# Patient Record
Sex: FEMALE | Employment: UNEMPLOYED | URBAN - METROPOLITAN AREA
[De-identification: names, ages, dates, MRNs, and addresses within clinical notes are randomized per-mention and may not be internally consistent; named-entity substitution may affect disease eponyms.]

---

## 2017-10-10 ENCOUNTER — HOSPITAL ENCOUNTER (EMERGENCY)
Age: 82
Discharge: HOME | End: 2017-10-10
Attending: EMERGENCY MEDICINE
Payer: MEDICARE

## 2017-10-10 VITALS
OXYGEN SATURATION: 100 % | DIASTOLIC BLOOD PRESSURE: 63 MMHG | SYSTOLIC BLOOD PRESSURE: 130 MMHG | TEMPERATURE: 97.7 F | HEART RATE: 60 BPM | RESPIRATION RATE: 15 BRPM

## 2017-10-10 DIAGNOSIS — R55 SYNCOPE AND COLLAPSE: Primary | ICD-10-CM

## 2017-10-10 LAB
ALBUMIN SERPL-MCNC: 3.3 G/DL (ref 3.4–5)
ALBUMIN/GLOB SERPL: 0.9 {RATIO} (ref 0.8–1.7)
ALP SERPL-CCNC: 73 U/L (ref 45–117)
ALT SERPL-CCNC: 17 U/L (ref 13–56)
ANION GAP SERPL CALC-SCNC: 5 MMOL/L (ref 3–18)
AST SERPL-CCNC: 16 U/L (ref 15–37)
BASOPHILS # BLD: 0 K/UL (ref 0–0.1)
BASOPHILS NFR BLD: 0 % (ref 0–2)
BILIRUB SERPL-MCNC: 0.7 MG/DL (ref 0.2–1)
BUN SERPL-MCNC: 11 MG/DL (ref 7–18)
BUN/CREAT SERPL: 13 (ref 12–20)
CALCIUM SERPL-MCNC: 8.9 MG/DL (ref 8.5–10.1)
CHLORIDE SERPL-SCNC: 107 MMOL/L (ref 100–108)
CK MB CFR SERPL CALC: NORMAL % (ref 0–4)
CK MB SERPL-MCNC: <1 NG/ML (ref 5–25)
CK SERPL-CCNC: 80 U/L (ref 26–192)
CO2 SERPL-SCNC: 28 MMOL/L (ref 21–32)
CREAT SERPL-MCNC: 0.83 MG/DL (ref 0.6–1.3)
DIFFERENTIAL METHOD BLD: ABNORMAL
EOSINOPHIL # BLD: 0.2 K/UL (ref 0–0.4)
EOSINOPHIL NFR BLD: 5 % (ref 0–5)
ERYTHROCYTE [DISTWIDTH] IN BLOOD BY AUTOMATED COUNT: 13.6 % (ref 11.6–14.5)
GLOBULIN SER CALC-MCNC: 3.6 G/DL (ref 2–4)
GLUCOSE SERPL-MCNC: 98 MG/DL (ref 74–99)
HCT VFR BLD AUTO: 38 % (ref 35–45)
HGB BLD-MCNC: 12.5 G/DL (ref 12–16)
LYMPHOCYTES # BLD: 1 K/UL (ref 0.9–3.6)
LYMPHOCYTES NFR BLD: 27 % (ref 21–52)
MCH RBC QN AUTO: 30.1 PG (ref 24–34)
MCHC RBC AUTO-ENTMCNC: 32.9 G/DL (ref 31–37)
MCV RBC AUTO: 91.6 FL (ref 74–97)
MONOCYTES # BLD: 0.4 K/UL (ref 0.05–1.2)
MONOCYTES NFR BLD: 10 % (ref 3–10)
NEUTS SEG # BLD: 2.1 K/UL (ref 1.8–8)
NEUTS SEG NFR BLD: 58 % (ref 40–73)
PLATELET # BLD AUTO: 185 K/UL (ref 135–420)
PMV BLD AUTO: 10.3 FL (ref 9.2–11.8)
POTASSIUM SERPL-SCNC: 4.3 MMOL/L (ref 3.5–5.5)
PROT SERPL-MCNC: 6.9 G/DL (ref 6.4–8.2)
RBC # BLD AUTO: 4.15 M/UL (ref 4.2–5.3)
SODIUM SERPL-SCNC: 140 MMOL/L (ref 136–145)
TROPONIN I SERPL-MCNC: <0.02 NG/ML (ref 0–0.04)
WBC # BLD AUTO: 3.7 K/UL (ref 4.6–13.2)

## 2017-10-10 PROCEDURE — 85025 COMPLETE CBC W/AUTO DIFF WBC: CPT | Performed by: EMERGENCY MEDICINE

## 2017-10-10 PROCEDURE — 93005 ELECTROCARDIOGRAM TRACING: CPT

## 2017-10-10 PROCEDURE — 82550 ASSAY OF CK (CPK): CPT | Performed by: EMERGENCY MEDICINE

## 2017-10-10 PROCEDURE — 80053 COMPREHEN METABOLIC PANEL: CPT | Performed by: EMERGENCY MEDICINE

## 2017-10-10 PROCEDURE — 99283 EMERGENCY DEPT VISIT LOW MDM: CPT

## 2017-10-10 NOTE — ED TRIAGE NOTES
Pt brought in via EMS from [facility]. Pt states she fainted and is feeling a little lightheaded, EMS says they got a call saying pt was unresponsive for a few seconds. .. Pt has hx of HTN BG was 86 as per medic. No LOC and denies hitting her head.

## 2017-10-10 NOTE — Clinical Note
-You were seen here today for: Syncope 
-Please follow up with your regular doctor as soon as possible 
-Return to the emergency department if your symptoms worsen or for any other concerns.

## 2017-10-10 NOTE — DISCHARGE INSTRUCTIONS

## 2017-10-10 NOTE — ED PROVIDER NOTES
HPI Comments: 9:02 AM      Krish Emerson is a 80 y.o. Female with PMHx of HTN, diverticulitis and bilateral arthritis presents to the ED via EMS from [facility] with a chief complaint of syncope 1 hour ago. EMS states receiving a call saying the pt was unresponsive for a few seconds. Nursing home nurse is present bedside and states the patient was having breakfast and became unconscious for about 60 seconds. Nurse reports a pulse of 56 and a normal BP. Pt states she hasn't changed her eating habits recently and is currently not on any new medications. Pt reports light-headedness associated due to the syncopal episode. Nurse reports pt is currently taking Lasix, Iron and Aricept. Pt denies any fever, cough, chest pain, SOB, dysuria, diaphoresis or any other symptoms or complaints. The history is provided by the patient, the EMS personnel and the nursing home. No  was used. Past Medical History:   Diagnosis Date    Arthritis     bilaterlal lower extremeties    Blood in stool 3/2013    GI bleeding        Past Surgical History:   Procedure Laterality Date    HX TONSILLECTOMY           Family History:   Problem Relation Age of Onset    Elevated Lipids Mother     Alcohol abuse Father     Elevated Lipids Father        Social History     Social History    Marital status:      Spouse name: N/A    Number of children: N/A    Years of education: N/A     Occupational History    Not on file. Social History Main Topics    Smoking status: Never Smoker    Smokeless tobacco: Never Used    Alcohol use No    Drug use: No    Sexual activity: Not on file     Other Topics Concern    Not on file     Social History Narrative         ALLERGIES: Review of patient's allergies indicates no known allergies. Review of Systems   Constitutional: Negative for activity change, fatigue and fever. HENT: Negative for congestion and rhinorrhea. Eyes: Negative for visual disturbance. Respiratory: Negative for shortness of breath. Cardiovascular: Negative for chest pain and palpitations. Gastrointestinal: Negative for abdominal pain, diarrhea, nausea and vomiting. Endocrine: Negative for polyuria. Genitourinary: Negative for dysuria and hematuria. Musculoskeletal: Negative for back pain. Skin: Negative for rash. Neurological: Positive for syncope and light-headedness. Negative for dizziness and weakness. Psychiatric/Behavioral: Negative for sleep disturbance. There were no vitals filed for this visit. Physical Exam   Constitutional: She is oriented to person, place, and time. She appears well-developed. HENT:   Head: Normocephalic and atraumatic. Eyes: Conjunctivae and EOM are normal.   Neck: Normal range of motion. Cardiovascular: Regular rhythm and normal heart sounds. Bradycardia present. Exam reveals no gallop and no friction rub. No murmur heard. Pulmonary/Chest: Effort normal and breath sounds normal. No stridor. She exhibits no tenderness. Abdominal: Soft. There is no tenderness. Musculoskeletal: Normal range of motion. She exhibits no tenderness. Neurological: She is alert and oriented to person, place, and time. Skin: Skin is warm and dry. She is not diaphoretic. Psychiatric: She has a normal mood and affect. Her behavior is normal.   Nursing note and vitals reviewed. MDM  Number of Diagnoses or Management Options  Diagnosis management comments: Pt presents with a prior diverticular bleed and recurring IR embolization. Will check blood counts for anemia and cardiac enzymes for any kind of cardiogenic origin of syncope. Will check EKG for any heart arrhythmia. Pt already on iron, so Guaiac test will not be helpful. She is on Lasix and does not appear to be dehydrated. Therefore, fluids will be less helpful in this situation, especially since she has normal/high BP.  Pt has no abdominal pain currently so less likely diverticulosis or diverticulitis, however, if any abnormal lab values, will scan the abdomen. Amount and/or Complexity of Data Reviewed  Clinical lab tests: reviewed and ordered  Tests in the radiology section of CPT®: ordered and reviewed  Tests in the medicine section of CPT®: ordered and reviewed  Decide to obtain previous medical records or to obtain history from someone other than the patient: yes  Obtain history from someone other than the patient: yes  Review and summarize past medical records: yes      ED Course       Procedures      Vitals:  Patient Vitals for the past 12 hrs:   Temp Pulse Resp BP SpO2   10/10/17 1030 - 60 15 - 100 %   10/10/17 1015 - (!) 59 10 130/63 98 %   10/10/17 0900 97.7 °F (36.5 °C) (!) 59 15 148/75 100 %       Medications Ordered:  Medications - No data to display    Lab Findings:  Recent Results (from the past 12 hour(s))   EKG, 12 LEAD, INITIAL    Collection Time: 10/10/17  9:25 AM   Result Value Ref Range    Ventricular Rate 55 BPM    Atrial Rate 55 BPM    P-R Interval 138 ms    QRS Duration 104 ms    Q-T Interval 422 ms    QTC Calculation (Bezet) 403 ms    Calculated P Axis 31 degrees    Calculated R Axis 3 degrees    Calculated T Axis 88 degrees    Diagnosis       Sinus bradycardia  Incomplete right bundle branch block  T wave abnormality, consider anterolateral ischemia  Abnormal ECG  When compared with ECG of 25-MAR-2015 08:51,  Vent. rate has decreased BY  30 BPM  Incomplete right bundle branch block is now present  ST no longer depressed in Anterior leads         EKG Interpretation by ED physician:  Rhythm: Sinus Bradycardia   Rate: 55 bpm  Interpretation: Incomplete right bundle, non-specific ST, no STEMI. EKG interpret by Lizbeth Boles MD 9:32 AM          -Re-evaluted the patient - feeling fine she says, no further dizziness, syncope, or diarrhea.  -Results were discussed and reviewed with pt who understood the implications.  Otherwise reassuring results.     -We discussed the diagnosis, treatment, and plan. Next steps in close outpt care include: close PMD follow up for cbc, ekg. She is stably bradycardic here in the high 59's to low 60s. Ab soft, doubt acute GI bleed here. Care discussed with family and nursing supervisor at her facility.     -They verbally convey understanding and agreement of the signs, symptoms, diagnosis, treatment and prognosis and additionally agree to follow up as discussed. All questions were answered, and we reviewed pertinent return precautions as seen in the discharge paperwork. Pt understood follow up instructions, and would return to the ED if any worsening or concerns. Jannet Rutledge MD  10:43 AM      Diagnosis:   1. Syncope and collapse        Disposition: Discharge     Follow-up Information     None           Patient's Medications   Start Taking    No medications on file   Continue Taking    ACETAMINOPHEN (TYLENOL) 325 MG TABLET    Take 650 mg by mouth every four (4) hours as needed for Pain. ASPIRIN 81 MG TABLET    Take 81 mg by mouth daily. FERROUS SULFATE (IRON) 325 MG (65 MG IRON) EC TABLET    Take 1 Tab by mouth three (3) times daily (with meals). These Medications have changed    No medications on file   Stop Taking    No medications on file       Scribe Attestation      Argenis Frederick acting as a scribe for and in the presence of Jannte Rutledge MD      October 10, 2017 at 9:34 AM       Provider Attestation:      I personally performed the services described in the documentation, reviewed the documentation, as recorded by the scribe in my presence, and it accurately and completely records my words and actions.  October 10, 2017 at 9:34 AM - Jannet Rutledge MD

## 2017-10-10 NOTE — ED NOTES
I have reviewed discharge instructions with the patient. The patient verbalized understanding. Pt left ED in NAD. Pt son was here and he signed for pt discharge and he understood discharge instructions.

## 2017-10-11 LAB
ATRIAL RATE: 55 BPM
CALCULATED P AXIS, ECG09: 31 DEGREES
CALCULATED R AXIS, ECG10: 3 DEGREES
CALCULATED T AXIS, ECG11: 88 DEGREES
DIAGNOSIS, 93000: NORMAL
P-R INTERVAL, ECG05: 138 MS
Q-T INTERVAL, ECG07: 422 MS
QRS DURATION, ECG06: 104 MS
QTC CALCULATION (BEZET), ECG08: 403 MS
VENTRICULAR RATE, ECG03: 55 BPM

## 2018-01-09 ENCOUNTER — HOSPITAL ENCOUNTER (EMERGENCY)
Age: 83
Discharge: HOME | End: 2018-01-09
Attending: EMERGENCY MEDICINE
Payer: MEDICARE

## 2018-01-09 ENCOUNTER — APPOINTMENT (OUTPATIENT)
Dept: CT IMAGING | Age: 83
End: 2018-01-09
Attending: PHYSICIAN ASSISTANT
Payer: MEDICARE

## 2018-01-09 VITALS
SYSTOLIC BLOOD PRESSURE: 150 MMHG | RESPIRATION RATE: 16 BRPM | DIASTOLIC BLOOD PRESSURE: 73 MMHG | HEART RATE: 67 BPM | WEIGHT: 180 LBS | OXYGEN SATURATION: 96 % | BODY MASS INDEX: 27.37 KG/M2 | TEMPERATURE: 97.9 F
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
## 2018-01-09 DIAGNOSIS — N39.0 URINARY TRACT INFECTION WITHOUT HEMATURIA, SITE UNSPECIFIED: ICD-10-CM

## 2018-01-09 LAB
ANION GAP SERPL CALC-SCNC: 6 MMOL/L (ref 3–18)
APPEARANCE UR: CLEAR
BACTERIA URNS QL MICRO: ABNORMAL /HPF
BASOPHILS # BLD: 0 K/UL (ref 0–0.06)
BASOPHILS NFR BLD: 0 % (ref 0–2)
BILIRUB UR QL: NEGATIVE
BUN SERPL-MCNC: 14 MG/DL (ref 7–18)
BUN/CREAT SERPL: 16 (ref 12–20)
CALCIUM SERPL-MCNC: 9.3 MG/DL (ref 8.5–10.1)
CHLORIDE SERPL-SCNC: 103 MMOL/L (ref 100–108)
CO2 SERPL-SCNC: 31 MMOL/L (ref 21–32)
COLOR UR: YELLOW
CREAT SERPL-MCNC: 0.85 MG/DL (ref 0.6–1.3)
DIFFERENTIAL METHOD BLD: ABNORMAL
EOSINOPHIL # BLD: 0.1 K/UL (ref 0–0.4)
EOSINOPHIL NFR BLD: 3 % (ref 0–5)
EPITH CASTS URNS QL MICRO: ABNORMAL /LPF (ref 0–5)
ERYTHROCYTE [DISTWIDTH] IN BLOOD BY AUTOMATED COUNT: 13.1 % (ref 11.6–14.5)
GLUCOSE SERPL-MCNC: 95 MG/DL (ref 74–99)
GLUCOSE UR STRIP.AUTO-MCNC: NEGATIVE MG/DL
HCT VFR BLD AUTO: 36.3 % (ref 35–45)
HGB BLD-MCNC: 11.6 G/DL (ref 12–16)
HGB UR QL STRIP: NEGATIVE
KETONES UR QL STRIP.AUTO: NEGATIVE MG/DL
LEUKOCYTE ESTERASE UR QL STRIP.AUTO: ABNORMAL
LYMPHOCYTES # BLD: 1.2 K/UL (ref 0.9–3.6)
LYMPHOCYTES NFR BLD: 26 % (ref 21–52)
MCH RBC QN AUTO: 29.3 PG (ref 24–34)
MCHC RBC AUTO-ENTMCNC: 32 G/DL (ref 31–37)
MCV RBC AUTO: 91.7 FL (ref 74–97)
MONOCYTES # BLD: 0.3 K/UL (ref 0.05–1.2)
MONOCYTES NFR BLD: 6 % (ref 3–10)
NEUTS SEG # BLD: 2.9 K/UL (ref 1.8–8)
NEUTS SEG NFR BLD: 65 % (ref 40–73)
NITRITE UR QL STRIP.AUTO: NEGATIVE
PH UR STRIP: 5.5 [PH] (ref 5–8)
PLATELET # BLD AUTO: 184 K/UL (ref 135–420)
PMV BLD AUTO: 9.5 FL (ref 9.2–11.8)
POTASSIUM SERPL-SCNC: 4.2 MMOL/L (ref 3.5–5.5)
PROT UR STRIP-MCNC: NEGATIVE MG/DL
RBC # BLD AUTO: 3.96 M/UL (ref 4.2–5.3)
RBC #/AREA URNS HPF: NEGATIVE /HPF (ref 0–5)
SODIUM SERPL-SCNC: 140 MMOL/L (ref 136–145)
SP GR UR REFRACTOMETRY: 1.01 (ref 1–1.03)
TROPONIN I SERPL-MCNC: <0.02 NG/ML (ref 0–0.04)
UROBILINOGEN UR QL STRIP.AUTO: 1 EU/DL (ref 0.2–1)
WBC # BLD AUTO: 4.5 K/UL (ref 4.6–13.2)
WBC URNS QL MICRO: ABNORMAL /HPF (ref 0–4)

## 2018-01-09 PROCEDURE — 84484 ASSAY OF TROPONIN QUANT: CPT

## 2018-01-09 PROCEDURE — 93005 ELECTROCARDIOGRAM TRACING: CPT

## 2018-01-09 PROCEDURE — 99284 EMERGENCY DEPT VISIT MOD MDM: CPT

## 2018-01-09 PROCEDURE — 87086 URINE CULTURE/COLONY COUNT: CPT | Performed by: PHYSICIAN ASSISTANT

## 2018-01-09 PROCEDURE — 80048 BASIC METABOLIC PNL TOTAL CA: CPT

## 2018-01-09 PROCEDURE — 74011250637 HC RX REV CODE- 250/637: Performed by: PHYSICIAN ASSISTANT

## 2018-01-09 PROCEDURE — 85025 COMPLETE CBC W/AUTO DIFF WBC: CPT

## 2018-01-09 PROCEDURE — 70450 CT HEAD/BRAIN W/O DYE: CPT

## 2018-01-09 PROCEDURE — 81001 URINALYSIS AUTO W/SCOPE: CPT | Performed by: PHYSICIAN ASSISTANT

## 2018-01-09 RX ORDER — CEPHALEXIN 500 MG/1
500 CAPSULE ORAL 2 TIMES DAILY
Qty: 20 CAP | Refills: 0 | Status: SHIPPED | OUTPATIENT
Start: 2018-01-09 | End: 2018-01-19

## 2018-01-09 RX ORDER — CEPHALEXIN 250 MG/1
500 CAPSULE ORAL
Status: COMPLETED | OUTPATIENT
Start: 2018-01-09 | End: 2018-01-09

## 2018-01-09 RX ADMIN — CEPHALEXIN 500 MG: 250 CAPSULE ORAL at 20:13

## 2018-01-09 NOTE — ED PROVIDER NOTES
HPI Comments: Pt is a 81yo F with hx of severe dementia presenting to ED via EMS from [address] [place] [address] who report that pt had unwitnessed fall. Pt is poor historian due to mentation however per NH staff, fall was unwitnessed therefore LOC or head injury is unknown. At the time of my evaluation, son at bedside- he denies being told by staff that pt fell but states he thinks pt is here for bladder pain and incontinence, a chronic issue currently being addressed by Dr. Effie Javier PCP. Pt denies sx to self. Denies pain. Limited history due to dementia. Patient is a 80 y.o. female presenting with fall. The history is provided by the patient. Fall          Past Medical History:   Diagnosis Date    Arthritis     bilaterlal lower extremeties    Blood in stool 3/2013    GI bleeding        Past Surgical History:   Procedure Laterality Date    HX TONSILLECTOMY           Family History:   Problem Relation Age of Onset    Elevated Lipids Mother     Alcohol abuse Father     Elevated Lipids Father        Social History     Social History    Marital status:      Spouse name: N/A    Number of children: N/A    Years of education: N/A     Occupational History    Not on file. Social History Main Topics    Smoking status: Never Smoker    Smokeless tobacco: Never Used    Alcohol use No    Drug use: No    Sexual activity: Not on file     Other Topics Concern    Not on file     Social History Narrative         ALLERGIES: Review of patient's allergies indicates no known allergies. Review of Systems   Unable to perform ROS: Dementia       Vitals:    01/09/18 1640   BP: 150/73   Pulse: 67   Resp: 16   Temp: 97.9 °F (36.6 °C)   SpO2: 96%   Weight: 81.6 kg (180 lb)            Physical Exam   Constitutional: She is oriented to person, place, and time. She appears well-developed and well-nourished. No distress. Laying in bed comfortably, NAD. HENT:   Head: Normocephalic and atraumatic.    Mouth/Throat: Oropharynx is clear and moist.   Eyes: Conjunctivae are normal.   Neck: Normal range of motion. Neck supple. Cardiovascular: Normal rate, regular rhythm, normal heart sounds and intact distal pulses. No murmur heard. Pulmonary/Chest: Effort normal and breath sounds normal. No respiratory distress. She has no wheezes. She exhibits no tenderness. Abdominal: Soft. Bowel sounds are normal. She exhibits no distension and no mass. There is no tenderness. There is no rebound and no guarding. Musculoskeletal: Normal range of motion. Non tender to midline palpation of the cervical, thoracic, and lumbar spine. No step off or deformity. FROM of BUE and BLE against resistance in flexion and extension with 5/5 strength. Non tender to bilateral shoulders/elbows/hands/hips/knees/ankles. Pulses intact and equal.       Neurological: She is alert and oriented to person, place, and time. No cranial nerve deficit. Coordination normal.   Skin: Skin is warm and dry. No rash noted. She is not diaphoretic. Psychiatric: She has a normal mood and affect. Her behavior is normal.   Nursing note and vitals reviewed. MDM  Number of Diagnoses or Management Options  Fall, initial encounter:   Urinary tract infection without hematuria, site unspecified:   Diagnosis management comments: 79yo F here from NH after reported unwitnessed fall. Cause and mechanism of fall unknown. PE unremarkable- no distress, no pain produced however pt with dementia therefore poor historian. Son at bedside- he initially was unaware of fall but since d/w self, he called NH and they reported to him that the patient said to caretaker she had fallen. CT of head unremrakable, labs obtained WNL. Cath ua with moderate leuks, will culture and place on abx for UTI, Irma Acosta MD is PCP, son will make apt tomorrow for recheck.    No indication for admission- non septic, VSS, no leukocytosis,     ED Course       Procedures

## 2018-01-09 NOTE — ED TRIAGE NOTES
Pt is an unwitnessed fall from [facility]. Pt with a hx of dementia, and is an unreliable source. Pt has no c/o pain, and is ambulatory with assistance.

## 2018-01-10 LAB
ATRIAL RATE: 64 BPM
CALCULATED P AXIS, ECG09: -3 DEGREES
CALCULATED R AXIS, ECG10: 3 DEGREES
CALCULATED T AXIS, ECG11: 4 DEGREES
DIAGNOSIS, 93000: NORMAL
P-R INTERVAL, ECG05: 114 MS
Q-T INTERVAL, ECG07: 396 MS
QRS DURATION, ECG06: 90 MS
QTC CALCULATION (BEZET), ECG08: 408 MS
VENTRICULAR RATE, ECG03: 64 BPM

## 2018-01-10 NOTE — DISCHARGE INSTRUCTIONS
Preventing Falls: Care Instructions  Your Care Instructions    Getting around your home safely can be a challenge if you have injuries or health problems that make it easy for you to fall. Loose rugs and furniture in walkways are among the dangers for many older people who have problems walking or who have poor eyesight. People who have conditions such as arthritis, osteoporosis, or dementia also have to be careful not to fall. You can make your home safer with a few simple measures. Follow-up care is a key part of your treatment and safety. Be sure to make and go to all appointments, and call your doctor if you are having problems. It's also a good idea to know your test results and keep a list of the medicines you take. How can you care for yourself at home? Taking care of yourself  · You may get dizzy if you do not drink enough water. To prevent dehydration, drink plenty of fluids, enough so that your urine is light yellow or clear like water. Choose water and other caffeine-free clear liquids. If you have kidney, heart, or liver disease and have to limit fluids, talk with your doctor before you increase the amount of fluids you drink. · Exercise regularly to improve your strength, muscle tone, and balance. Walk if you can. Swimming may be a good choice if you cannot walk easily. · Have your vision and hearing checked each year or any time you notice a change. If you have trouble seeing and hearing, you might not be able to avoid objects and could lose your balance. · Know the side effects of the medicines you take. Ask your doctor or pharmacist whether the medicines you take can affect your balance. Sleeping pills or sedatives can affect your balance. · Limit the amount of alcohol you drink. Alcohol can impair your balance and other senses. · Ask your doctor whether calluses or corns on your feet need to be removed.  If you wear loose-fitting shoes because of calluses or corns, you can lose your balance and fall. · Talk to your doctor if you have numbness in your feet. Preventing falls at home  · Remove raised doorway thresholds, throw rugs, and clutter. Repair loose carpet or raised areas in the floor. · Move furniture and electrical cords to keep them out of walking paths. · Use nonskid floor wax, and wipe up spills right away, especially on ceramic tile floors. · If you use a walker or cane, put rubber tips on it. If you use crutches, clean the bottoms of them regularly with an abrasive pad, such as steel wool. · Keep your house well lit, especially Laurie Pickler, and outside walkways. Use night-lights in areas such as hallways and bathrooms. Add extra light switches or use remote switches (such as switches that go on or off when you clap your hands) to make it easier to turn lights on if you have to get up during the night. · Install sturdy handrails on stairways. · Move items in your cabinets so that the things you use a lot are on the lower shelves (about waist level). · Keep a cordless phone and a flashlight with new batteries by your bed. If possible, put a phone in each of the main rooms of your house, or carry a cell phone in case you fall and cannot reach a phone. Or, you can wear a device around your neck or wrist. You push a button that sends a signal for help. · Wear low-heeled shoes that fit well and give your feet good support. Use footwear with nonskid soles. Check the heels and soles of your shoes for wear. Repair or replace worn heels or soles. · Do not wear socks without shoes on wood floors. · Walk on the grass when the sidewalks are slippery. If you live in an area that gets snow and ice in the winter, sprinkle salt on slippery steps and sidewalks. Preventing falls in the bath  · Install grab bars and nonskid mats inside and outside your shower or tub and near the toilet and sinks. · Use shower chairs and bath benches.   · Use a hand-held shower head that will allow you to sit while showering. · Get into a tub or shower by putting the weaker leg in first. Get out of a tub or shower with your strong side first.  · Repair loose toilet seats and consider installing a raised toilet seat to make getting on and off the toilet easier. · Keep your bathroom door unlocked while you are in the shower. Where can you learn more? Go to http://ariana-mahin.info/. Enter 0476 79 69 71 in the search box to learn more about \"Preventing Falls: Care Instructions. \"  Current as of: May 12, 2017  Content Version: 11.4  © 8527-1671 [employer]. Care instructions adapted under license by [employer] (which disclaims liability or warranty for this information). If you have questions about a medical condition or this instruction, always ask your healthcare professional. [place] [address] any warranty or liability for your use of this information. Urinary Tract Infection in Women: Care Instructions  Your Care Instructions    A urinary tract infection, or UTI, is a general term for an infection anywhere between the kidneys and the urethra (where urine comes out). Most UTIs are bladder infections. They often cause pain or burning when you urinate. UTIs are caused by bacteria and can be cured with antibiotics. Be sure to complete your treatment so that the infection goes away. Follow-up care is a key part of your treatment and safety. Be sure to make and go to all appointments, and call your doctor if you are having problems. It's also a good idea to know your test results and keep a list of the medicines you take. How can you care for yourself at home? · Take your antibiotics as directed. Do not stop taking them just because you feel better. You need to take the full course of antibiotics. · Drink extra water and other fluids for the next day or two. This may help wash out the bacteria that are causing the infection.  (If you have kidney, heart, or liver disease and have to limit fluids, talk with your doctor before you increase your fluid intake.)  · Avoid drinks that are carbonated or have caffeine. They can irritate the bladder. · Urinate often. Try to empty your bladder each time. · To relieve pain, take a hot bath or lay a heating pad set on low over your lower belly or genital area. Never go to sleep with a heating pad in place. To prevent UTIs  · Drink plenty of water each day. This helps you urinate often, which clears bacteria from your system. (If you have kidney, heart, or liver disease and have to limit fluids, talk with your doctor before you increase your fluid intake.)  · Urinate when you need to. · Urinate right after you have sex. · Change sanitary pads often. · Avoid douches, bubble baths, feminine hygiene sprays, and other feminine hygiene products that have deodorants. · After going to the bathroom, wipe from front to back. When should you call for help? Call your doctor now or seek immediate medical care if:  ? · Symptoms such as fever, chills, nausea, or vomiting get worse or appear for the first time. ? · You have new pain in your back just below your rib cage. This is called flank pain. ? · There is new blood or pus in your urine. ? · You have any problems with your antibiotic medicine. ? Watch closely for changes in your health, and be sure to contact your doctor if:  ? · You are not getting better after taking an antibiotic for 2 days. ? · Your symptoms go away but then come back. Where can you learn more? Go to http://ariana-mahin.info/. Enter D769 in the search box to learn more about \"Urinary Tract Infection in Women: Care Instructions. \"  Current as of: May 12, 2017  Content Version: 11.4  © 2224-5512 [employer]. Care instructions adapted under license by [employer] (which disclaims liability or warranty for this information).  If you have questions about a medical condition or this instruction, always ask your healthcare professional. [place] [address] any warranty or liability for your use of this information.

## 2018-01-11 LAB
BACTERIA SPEC CULT: ABNORMAL
BACTERIA SPEC CULT: ABNORMAL
SERVICE CMNT-IMP: ABNORMAL

## 2018-06-29 ENCOUNTER — APPOINTMENT (OUTPATIENT)
Dept: CT IMAGING | Age: 83
End: 2018-06-29
Attending: EMERGENCY MEDICINE
Payer: MEDICARE

## 2018-06-29 ENCOUNTER — HOSPITAL ENCOUNTER (OUTPATIENT)
Age: 83
Setting detail: OBSERVATION
Discharge: HOME | End: 2018-07-02
Attending: EMERGENCY MEDICINE | Admitting: INTERNAL MEDICINE
Payer: MEDICARE

## 2018-06-29 DIAGNOSIS — R55 SYNCOPE, UNSPECIFIED SYNCOPE TYPE: Primary | ICD-10-CM

## 2018-06-29 LAB
ANION GAP SERPL CALC-SCNC: 4 MMOL/L (ref 3–18)
APPEARANCE UR: CLEAR
BASOPHILS # BLD: 0 K/UL (ref 0–0.06)
BASOPHILS NFR BLD: 0 % (ref 0–2)
BILIRUB UR QL: NEGATIVE
BUN SERPL-MCNC: 14 MG/DL (ref 7–18)
BUN/CREAT SERPL: 15 (ref 12–20)
CALCIUM SERPL-MCNC: 9.3 MG/DL (ref 8.5–10.1)
CHLORIDE SERPL-SCNC: 106 MMOL/L (ref 100–108)
CK MB CFR SERPL CALC: NORMAL % (ref 0–4)
CK MB SERPL-MCNC: <1 NG/ML (ref 5–25)
CK SERPL-CCNC: 82 U/L (ref 26–192)
CO2 SERPL-SCNC: 31 MMOL/L (ref 21–32)
COLOR UR: YELLOW
CREAT SERPL-MCNC: 0.96 MG/DL (ref 0.6–1.3)
DIFFERENTIAL METHOD BLD: NORMAL
EOSINOPHIL # BLD: 0.1 K/UL (ref 0–0.4)
EOSINOPHIL NFR BLD: 3 % (ref 0–5)
ERYTHROCYTE [DISTWIDTH] IN BLOOD BY AUTOMATED COUNT: 13.2 % (ref 11.6–14.5)
GLUCOSE SERPL-MCNC: 97 MG/DL (ref 74–99)
GLUCOSE UR STRIP.AUTO-MCNC: NEGATIVE MG/DL
HCT VFR BLD AUTO: 39.9 % (ref 35–45)
HGB BLD-MCNC: 12.9 G/DL (ref 12–16)
HGB UR QL STRIP: NEGATIVE
KETONES UR QL STRIP.AUTO: NEGATIVE MG/DL
LEUKOCYTE ESTERASE UR QL STRIP.AUTO: NEGATIVE
LYMPHOCYTES # BLD: 1.6 K/UL (ref 0.9–3.6)
LYMPHOCYTES NFR BLD: 31 % (ref 21–52)
MCH RBC QN AUTO: 29.6 PG (ref 24–34)
MCHC RBC AUTO-ENTMCNC: 32.3 G/DL (ref 31–37)
MCV RBC AUTO: 91.5 FL (ref 74–97)
MONOCYTES # BLD: 0.5 K/UL (ref 0.05–1.2)
MONOCYTES NFR BLD: 9 % (ref 3–10)
NEUTS SEG # BLD: 2.9 K/UL (ref 1.8–8)
NEUTS SEG NFR BLD: 57 % (ref 40–73)
NITRITE UR QL STRIP.AUTO: NEGATIVE
PH UR STRIP: 7 [PH] (ref 5–8)
PLATELET # BLD AUTO: 198 K/UL (ref 135–420)
PMV BLD AUTO: 10.5 FL (ref 9.2–11.8)
POTASSIUM SERPL-SCNC: 4.9 MMOL/L (ref 3.5–5.5)
PROT UR STRIP-MCNC: NEGATIVE MG/DL
RBC # BLD AUTO: 4.36 M/UL (ref 4.2–5.3)
SODIUM SERPL-SCNC: 141 MMOL/L (ref 136–145)
SP GR UR REFRACTOMETRY: 1.01 (ref 1–1.03)
TROPONIN I SERPL-MCNC: <0.02 NG/ML (ref 0–0.04)
UROBILINOGEN UR QL STRIP.AUTO: 1 EU/DL (ref 0.2–1)
WBC # BLD AUTO: 5.1 K/UL (ref 4.6–13.2)

## 2018-06-29 PROCEDURE — 80048 BASIC METABOLIC PNL TOTAL CA: CPT | Performed by: EMERGENCY MEDICINE

## 2018-06-29 PROCEDURE — 99285 EMERGENCY DEPT VISIT HI MDM: CPT

## 2018-06-29 PROCEDURE — 93005 ELECTROCARDIOGRAM TRACING: CPT

## 2018-06-29 PROCEDURE — 99218 HC RM OBSERVATION: CPT

## 2018-06-29 PROCEDURE — 70450 CT HEAD/BRAIN W/O DYE: CPT

## 2018-06-29 PROCEDURE — 85025 COMPLETE CBC W/AUTO DIFF WBC: CPT | Performed by: EMERGENCY MEDICINE

## 2018-06-29 PROCEDURE — 82550 ASSAY OF CK (CPK): CPT | Performed by: EMERGENCY MEDICINE

## 2018-06-29 PROCEDURE — 81003 URINALYSIS AUTO W/O SCOPE: CPT | Performed by: EMERGENCY MEDICINE

## 2018-06-29 NOTE — ED TRIAGE NOTES
Pt brought in via EMS from [facility] with a complaint of \" Waking up unresponsive\" Pt is alert and awake in bed in NAD at this time, unsure of why she is here. Pt has hx of dementia.  enroute with EMS, [facility].

## 2018-06-29 NOTE — ED PROVIDER NOTES
EMERGENCY DEPARTMENT HISTORY AND PHYSICAL EXAM    6:41 PM      Date: 6/29/2018  Patient Name: Terra Summers    History of Presenting Illness     Chief Complaint   Patient presents with    Other         History Provided By: Patient and Patient's Son    Chief Complaint: Unresponsive episode  Duration:  Minutes  Timing:  Acute  Location:   Quality: Unresponsive  Severity: Severe  Modifying Factors: None  Associated Symptoms: frequent urination      Additional History (Context): Alice Mendoza is a 80 y.o. female with No significant past medical history who presents with complaints of unresponsiveness at residence. Son at bedside states that staff noticed her in the bedroom on the bed not responding. No seizure-like activity. Last normal 1500 today. Son notes that this happened before when she had a UTI. Pt admits to frequent urination, no dysuria or hematuria. She denies N/V/D, constipation, chest pain, or cough. Denies any weakness, headache, or blurred vision, and states that she is eating as usual. RN states when the pt arrived she stated she did not know why she was here. Son states she only takes ASA and iron. No other complaints. PCP: MD Jc      Past History     Past Medical History:  Past Medical History:   Diagnosis Date    Arthritis     bilaterlal lower extremeties    Blood in stool 3/2013    GI bleeding        Past Surgical History:  Past Surgical History:   Procedure Laterality Date    HX TONSILLECTOMY         Family History:  Family History   Problem Relation Age of Onset    Elevated Lipids Mother     Alcohol abuse Father     Elevated Lipids Father        Social History:  Social History   Substance Use Topics    Smoking status: Never Smoker    Smokeless tobacco: Never Used    Alcohol use No       Allergies:  No Known Allergies      Review of Systems     Review of Systems   Constitutional: Negative for fever. HENT: Negative for sore throat.     Eyes: Negative for redness. Respiratory: Negative for shortness of breath and wheezing. Gastrointestinal: Negative for abdominal pain and nausea. Genitourinary: Positive for frequency. Negative for dysuria and hematuria. Musculoskeletal: Negative for neck stiffness. Skin: Negative for pallor. Neurological: Negative for seizures, weakness and headaches. Hematological: Does not bruise/bleed easily. Psychiatric/Behavioral:        Positive for unresponsive episode   All other systems reviewed and are negative. Physical Exam     Visit Vitals    /79    Pulse 64    Temp 98.6 °F (37 °C)    Resp 17    SpO2 100%       Physical Exam   Constitutional: She is oriented to person, place, and time. She appears well-developed and well-nourished. No distress. HENT:   Head: Normocephalic and atraumatic. Mouth/Throat: Oropharynx is clear and moist.   Eyes: Conjunctivae are normal. Pupils are equal, round, and reactive to light. No scleral icterus. Neck: Normal range of motion. Neck supple. Cardiovascular: Normal rate, regular rhythm and intact distal pulses. Exam reveals no gallop and no friction rub. No murmur heard. Capillary refill < 3 seconds   Pulmonary/Chest: Effort normal and breath sounds normal. No respiratory distress. She has no wheezes. She has no rales. Abdominal: Soft. Bowel sounds are normal. She exhibits no distension. There is no tenderness. Musculoskeletal: Normal range of motion. She exhibits no edema. Lymphadenopathy:     She has no cervical adenopathy. Neurological: She is alert and oriented to person, place, and time. No cranial nerve deficit. No slurred speech   No facial droop  Strength 5/5   No cerebellar ataxia on finger to nose test  No drifting of upper or lower extremities  Cranial nerves II-XII grossly intact  Follows all directions   Skin: Skin is warm and dry. She is not diaphoretic. Nursing note and vitals reviewed.         Diagnostic Study Results     Labs -  Recent Results (from the past 12 hour(s))   CBC WITH AUTOMATED DIFF    Collection Time: 06/29/18  6:35 PM   Result Value Ref Range    WBC 5.1 4.6 - 13.2 K/uL    RBC 4.36 4.20 - 5.30 M/uL    HGB 12.9 12.0 - 16.0 g/dL    HCT 39.9 35.0 - 45.0 %    MCV 91.5 74.0 - 97.0 FL    MCH 29.6 24.0 - 34.0 PG    MCHC 32.3 31.0 - 37.0 g/dL    RDW 13.2 11.6 - 14.5 %    PLATELET 173 106 - 745 K/uL    MPV 10.5 9.2 - 11.8 FL    NEUTROPHILS 57 40 - 73 %    LYMPHOCYTES 31 21 - 52 %    MONOCYTES 9 3 - 10 %    EOSINOPHILS 3 0 - 5 %    BASOPHILS 0 0 - 2 %    ABS. NEUTROPHILS 2.9 1.8 - 8.0 K/UL    ABS. LYMPHOCYTES 1.6 0.9 - 3.6 K/UL    ABS. MONOCYTES 0.5 0.05 - 1.2 K/UL    ABS. EOSINOPHILS 0.1 0.0 - 0.4 K/UL    ABS.  BASOPHILS 0.0 0.0 - 0.06 K/UL    DF AUTOMATED     METABOLIC PANEL, BASIC    Collection Time: 06/29/18  6:35 PM   Result Value Ref Range    Sodium 141 136 - 145 mmol/L    Potassium 4.9 3.5 - 5.5 mmol/L    Chloride 106 100 - 108 mmol/L    CO2 31 21 - 32 mmol/L    Anion gap 4 3.0 - 18 mmol/L    Glucose 97 74 - 99 mg/dL    BUN 14 7.0 - 18 MG/DL    Creatinine 0.96 0.6 - 1.3 MG/DL    BUN/Creatinine ratio 15 12 - 20      GFR est AA >60 >60 ml/min/1.73m2    GFR est non-AA 55 (L) >60 ml/min/1.73m2    Calcium 9.3 8.5 - 10.1 MG/DL   CARDIAC PANEL,(CK, CKMB & TROPONIN)    Collection Time: 06/29/18  6:35 PM   Result Value Ref Range    CK 82 26 - 192 U/L    CK - MB <1.0 <3.6 ng/ml    CK-MB Index  0.0 - 4.0 %     CALCULATION NOT PERFORMED WHEN RESULT IS BELOW LINEAR LIMIT    Troponin-I, Qt. <0.02 0.0 - 0.045 NG/ML   URINALYSIS W/ RFLX MICROSCOPIC    Collection Time: 06/29/18  7:42 PM   Result Value Ref Range    Color YELLOW      Appearance CLEAR      Specific gravity 1.015 1.005 - 1.030      pH (UA) 7.0 5.0 - 8.0      Protein NEGATIVE  NEG mg/dL    Glucose NEGATIVE  NEG mg/dL    Ketone NEGATIVE  NEG mg/dL    Bilirubin NEGATIVE  NEG      Blood NEGATIVE  NEG      Urobilinogen 1.0 0.2 - 1.0 EU/dL    Nitrites NEGATIVE  NEG      Leukocyte Esterase NEGATIVE  NEG     EKG, 12 LEAD, INITIAL    Collection Time: 06/29/18  8:28 PM   Result Value Ref Range    Ventricular Rate 68 BPM    Atrial Rate 68 BPM    P-R Interval 136 ms    QRS Duration 98 ms    Q-T Interval 400 ms    QTC Calculation (Bezet) 425 ms    Calculated P Axis 33 degrees    Calculated R Axis 19 degrees    Calculated T Axis -10 degrees    Diagnosis       Normal sinus rhythm  Incomplete right bundle branch block  T wave abnormality, consider anterolateral ischemia  Abnormal ECG  When compared with ECG of 09-JAN-2018 18:38,  No significant change was found         Radiologic Studies -   CT HEAD WO CONT    (Results Pending)         Medical Decision Making   I am the first provider for this patient. I reviewed the vital signs, available nursing notes, past medical history, past surgical history, family history and social history. Vital Signs-Reviewed the patient's vital signs. Pulse Oximetry Analysis -  99% on room air (Interpretation)WNL    Cardiac Monitor:  Rate: 71 BPM  Rhythm:  Normal Sinus Rhythm      EKG interpreted by ED provider at 8:30PM: NSR, rate 68, normal Qtc, no NAIF, has T wave inversions in leads V3-V6. Is similar to old EKG 10/2017    Records Reviewed: Nursing Notes and Old Medical Records (Time of Review: 6:41 PM)    Provider Notes (Medical Decision Making):  MDM  Number of Diagnoses or Management Options  Diagnosis management comments: DDX: UTI, metabolic. Sx similar to prior UTI. Will check labs and urine. Neuro is grossly intact. Consider CT head, will monitor patient. Medications - No data to display    ED Course: Progress Notes, Reevaluation, and Consults:  8:11 PM Labs reassuring, UA (-). CT head pending. Reassessed pt and in no distress, family at bedside. Discussed current results and that CT pending.     Although I am signing out patient to Dr. Higinio Closs and appreciate his care, I will remain provider of record  Patience Andre, DO  Consult:  Discussed care with Dr Kasey Bravo, Specialty: hospitalist  Standard discussion; including history of patients chief complaint, available diagnostic results, and treatment course. He came to ED to evaluate pt. Wants call back with CT report. He will determine dispo. 8:48 PM, 6/29/2018     8:56 PM : Pt care transferred to Dr. Pooja Basilio, ED provider. History of patient complaint(s), available diagnostic reports and current treatment plan has been discussed thoroughly. Bedside rounding on patient occured : yes . Intended disposition of patient : TBD  Pending diagnostics reports and/or labs (please list): CT head, reassess, call hospitalist for dispo      Diagnosis     Clinical Impression:   1. Syncope, unspecified syncope type        Disposition: Pending    Follow-up Information     None           Patient's Medications   Start Taking    No medications on file   Continue Taking    ACETAMINOPHEN (TYLENOL) 325 MG TABLET    Take 650 mg by mouth every four (4) hours as needed for Pain. ASPIRIN 81 MG TABLET    Take 81 mg by mouth daily. FERROUS SULFATE (IRON) 325 MG (65 MG IRON) EC TABLET    Take 1 Tab by mouth three (3) times daily (with meals). These Medications have changed    No medications on file   Stop Taking    No medications on file     _______________________________    Attestations:  Chrissy Diez acting as a scribe for and in the presence of Meenu Yoder DO      June 29, 2018 at 9:18 PM       Provider Attestation:      I personally performed the services described in the documentation, reviewed the documentation, as recorded by the scribe in my presence, and it accurately and completely records my words and actions.  June 29, 2018 at 9:18 PM - Meenu Yoder DO    _______________________________

## 2018-06-29 NOTE — ED NOTES
Bedside shift change report given to Tabatha Tenorio (oncoming nurse) by [facility], RN (offgoing nurse). Report included the following information SBAR, ED Summary, MAR and Recent Results.

## 2018-06-30 ENCOUNTER — APPOINTMENT (OUTPATIENT)
Dept: NON INVASIVE DIAGNOSTICS | Age: 83
End: 2018-06-30
Attending: INTERNAL MEDICINE
Payer: MEDICARE

## 2018-06-30 LAB
ANION GAP SERPL CALC-SCNC: 7 MMOL/L (ref 3–18)
ATRIAL RATE: 68 BPM
BASOPHILS # BLD: 0 K/UL (ref 0–0.06)
BASOPHILS NFR BLD: 0 % (ref 0–2)
BUN SERPL-MCNC: 9 MG/DL (ref 7–18)
BUN/CREAT SERPL: 15 (ref 12–20)
CALCIUM SERPL-MCNC: 8.8 MG/DL (ref 8.5–10.1)
CALCULATED P AXIS, ECG09: 33 DEGREES
CALCULATED R AXIS, ECG10: 19 DEGREES
CALCULATED T AXIS, ECG11: -10 DEGREES
CHLORIDE SERPL-SCNC: 109 MMOL/L (ref 100–108)
CO2 SERPL-SCNC: 27 MMOL/L (ref 21–32)
CREAT SERPL-MCNC: 0.61 MG/DL (ref 0.6–1.3)
DIAGNOSIS, 93000: NORMAL
DIFFERENTIAL METHOD BLD: ABNORMAL
EOSINOPHIL # BLD: 0.2 K/UL (ref 0–0.4)
EOSINOPHIL NFR BLD: 4 % (ref 0–5)
ERYTHROCYTE [DISTWIDTH] IN BLOOD BY AUTOMATED COUNT: 13.3 % (ref 11.6–14.5)
GLUCOSE BLD STRIP.AUTO-MCNC: 119 MG/DL (ref 70–110)
GLUCOSE SERPL-MCNC: 110 MG/DL (ref 74–99)
HCT VFR BLD AUTO: 37.9 % (ref 35–45)
HGB BLD-MCNC: 12.1 G/DL (ref 12–16)
LYMPHOCYTES # BLD: 1.3 K/UL (ref 0.9–3.6)
LYMPHOCYTES NFR BLD: 36 % (ref 21–52)
MAGNESIUM SERPL-MCNC: 2.3 MG/DL (ref 1.6–2.6)
MCH RBC QN AUTO: 29.1 PG (ref 24–34)
MCHC RBC AUTO-ENTMCNC: 31.9 G/DL (ref 31–37)
MCV RBC AUTO: 91.1 FL (ref 74–97)
MONOCYTES # BLD: 0.2 K/UL (ref 0.05–1.2)
MONOCYTES NFR BLD: 5 % (ref 3–10)
NEUTS SEG # BLD: 2 K/UL (ref 1.8–8)
NEUTS SEG NFR BLD: 55 % (ref 40–73)
P-R INTERVAL, ECG05: 136 MS
PHOSPHATE SERPL-MCNC: 3.6 MG/DL (ref 2.5–4.9)
PLATELET # BLD AUTO: 168 K/UL (ref 135–420)
PMV BLD AUTO: 10.2 FL (ref 9.2–11.8)
POTASSIUM SERPL-SCNC: 4.1 MMOL/L (ref 3.5–5.5)
Q-T INTERVAL, ECG07: 400 MS
QRS DURATION, ECG06: 98 MS
QTC CALCULATION (BEZET), ECG08: 425 MS
RBC # BLD AUTO: 4.16 M/UL (ref 4.2–5.3)
SODIUM SERPL-SCNC: 143 MMOL/L (ref 136–145)
VENTRICULAR RATE, ECG03: 68 BPM
WBC # BLD AUTO: 3.6 K/UL (ref 4.6–13.2)

## 2018-06-30 PROCEDURE — 80048 BASIC METABOLIC PNL TOTAL CA: CPT | Performed by: INTERNAL MEDICINE

## 2018-06-30 PROCEDURE — 82962 GLUCOSE BLOOD TEST: CPT

## 2018-06-30 PROCEDURE — 84100 ASSAY OF PHOSPHORUS: CPT | Performed by: INTERNAL MEDICINE

## 2018-06-30 PROCEDURE — 96372 THER/PROPH/DIAG INJ SC/IM: CPT

## 2018-06-30 PROCEDURE — 99218 HC RM OBSERVATION: CPT

## 2018-06-30 PROCEDURE — 85025 COMPLETE CBC W/AUTO DIFF WBC: CPT | Performed by: INTERNAL MEDICINE

## 2018-06-30 PROCEDURE — 36415 COLL VENOUS BLD VENIPUNCTURE: CPT | Performed by: INTERNAL MEDICINE

## 2018-06-30 PROCEDURE — 74011250636 HC RX REV CODE- 250/636: Performed by: INTERNAL MEDICINE

## 2018-06-30 PROCEDURE — 83735 ASSAY OF MAGNESIUM: CPT | Performed by: INTERNAL MEDICINE

## 2018-06-30 PROCEDURE — 96361 HYDRATE IV INFUSION ADD-ON: CPT

## 2018-06-30 PROCEDURE — 96360 HYDRATION IV INFUSION INIT: CPT

## 2018-06-30 PROCEDURE — 74011250636 HC RX REV CODE- 250/636: Performed by: EMERGENCY MEDICINE

## 2018-06-30 PROCEDURE — 77030038269 HC DRN EXT URIN PURWCK BARD -A

## 2018-06-30 PROCEDURE — 74011250637 HC RX REV CODE- 250/637: Performed by: INTERNAL MEDICINE

## 2018-06-30 RX ORDER — FUROSEMIDE 20 MG/1
TABLET ORAL DAILY
COMMUNITY
End: 2018-07-02

## 2018-06-30 RX ORDER — DONEPEZIL HYDROCHLORIDE 10 MG/1
10 TABLET, FILM COATED ORAL
Status: ON HOLD | COMMUNITY
End: 2018-07-02

## 2018-06-30 RX ORDER — ACETAMINOPHEN 325 MG/1
650 TABLET ORAL
Status: DISCONTINUED | OUTPATIENT
Start: 2018-06-30 | End: 2018-07-02 | Stop reason: HOSPADM

## 2018-06-30 RX ORDER — ASPIRIN 81 MG/1
81 TABLET ORAL DAILY
Status: DISCONTINUED | OUTPATIENT
Start: 2018-06-30 | End: 2018-07-02 | Stop reason: HOSPADM

## 2018-06-30 RX ORDER — DOCUSATE SODIUM 100 MG/1
100 CAPSULE, LIQUID FILLED ORAL
Status: DISCONTINUED | OUTPATIENT
Start: 2018-06-30 | End: 2018-07-02 | Stop reason: HOSPADM

## 2018-06-30 RX ORDER — ONDANSETRON 2 MG/ML
4 INJECTION INTRAMUSCULAR; INTRAVENOUS
Status: DISCONTINUED | OUTPATIENT
Start: 2018-06-30 | End: 2018-07-02 | Stop reason: HOSPADM

## 2018-06-30 RX ORDER — LANOLIN ALCOHOL/MO/W.PET/CERES
1 CREAM (GRAM) TOPICAL
Status: DISCONTINUED | OUTPATIENT
Start: 2018-06-30 | End: 2018-07-02 | Stop reason: HOSPADM

## 2018-06-30 RX ORDER — ENOXAPARIN SODIUM 100 MG/ML
40 INJECTION SUBCUTANEOUS EVERY 24 HOURS
Status: DISCONTINUED | OUTPATIENT
Start: 2018-06-30 | End: 2018-07-02 | Stop reason: HOSPADM

## 2018-06-30 RX ORDER — SODIUM CHLORIDE 9 MG/ML
75 INJECTION, SOLUTION INTRAVENOUS CONTINUOUS
Status: DISCONTINUED | OUTPATIENT
Start: 2018-06-30 | End: 2018-07-01

## 2018-06-30 RX ADMIN — SODIUM CHLORIDE 100 ML/HR: 900 INJECTION, SOLUTION INTRAVENOUS at 00:54

## 2018-06-30 RX ADMIN — SODIUM CHLORIDE 100 ML/HR: 900 INJECTION, SOLUTION INTRAVENOUS at 10:59

## 2018-06-30 RX ADMIN — ASPIRIN 81 MG: 81 TABLET, COATED ORAL at 08:09

## 2018-06-30 RX ADMIN — FERROUS SULFATE TAB 325 MG (65 MG ELEMENTAL FE) 325 MG: 325 (65 FE) TAB at 08:09

## 2018-06-30 RX ADMIN — ENOXAPARIN SODIUM 40 MG: 40 INJECTION, SOLUTION INTRAVENOUS; SUBCUTANEOUS at 02:28

## 2018-06-30 RX ADMIN — SODIUM CHLORIDE 75 ML/HR: 900 INJECTION, SOLUTION INTRAVENOUS at 20:43

## 2018-06-30 NOTE — PROGRESS NOTES
[facility]  Progress Note    Patient: Gladstone Boxer Age: 80 y.o. : 1932 MR#: [id number] SSN: [id number]  Date/Time: 2018    Subjective:     Patient sitting in bed in NAD, awake, follows some commands. Son and daughter in law at bedside    Assessment/Plan:     1- ? Near syncope  2- ?likely underlying dementia  1- advanced age    PLAN  Check Orthostatics  Monitor on tele  Check echo- rescheduled for tomorrow , son states he will convince patient  PT, Ot  D/w patient and family, d/w RN  Full code   consult        Case discussed with:  []Patient  []Family  []Nursing  []Case Management  DVT Prophylaxis:  []Lovenox  []Hep SQ  []SCDs  []Coumadin   []On Heparin gtt    Objective:   VS:   Visit Vitals    /82 (BP 1 Location: Left arm, BP Patient Position: Supine)    Pulse (!) 53    Temp 96.6 °F (35.9 °C)    Resp 17    Wt 71.5 kg (157 lb 9.6 oz)    SpO2 99%    BMI 23.96 kg/m2      Tmax/24hrs: Temp (24hrs), Av.9 °F (36.6 °C), Min:96.6 °F (35.9 °C), Max:98.6 °F (37 °C)    Input/Output:   Intake/Output Summary (Last 24 hours) at 18 1504  Last data filed at 18 1315   Gross per 24 hour   Intake              360 ml   Output                0 ml   Net              360 ml       General:  Awake, follows commands  Cardiovascular:  S1S2+, RRR  Pulmonary:  CTA b/l  GI:  Soft, BS+, NT, ND  Extremities:  trace edema      Labs:    Recent Results (from the past 24 hour(s))   CBC WITH AUTOMATED DIFF    Collection Time: 18  6:35 PM   Result Value Ref Range    WBC 5.1 4.6 - 13.2 K/uL    RBC 4.36 4.20 - 5.30 M/uL    HGB 12.9 12.0 - 16.0 g/dL    HCT 39.9 35.0 - 45.0 %    MCV 91.5 74.0 - 97.0 FL    MCH 29.6 24.0 - 34.0 PG    MCHC 32.3 31.0 - 37.0 g/dL    RDW 13.2 11.6 - 14.5 %    PLATELET 258 771 - 169 K/uL    MPV 10.5 9.2 - 11.8 FL    NEUTROPHILS 57 40 - 73 %    LYMPHOCYTES 31 21 - 52 %    MONOCYTES 9 3 - 10 %    EOSINOPHILS 3 0 - 5 % BASOPHILS 0 0 - 2 %    ABS. NEUTROPHILS 2.9 1.8 - 8.0 K/UL    ABS. LYMPHOCYTES 1.6 0.9 - 3.6 K/UL    ABS. MONOCYTES 0.5 0.05 - 1.2 K/UL    ABS. EOSINOPHILS 0.1 0.0 - 0.4 K/UL    ABS.  BASOPHILS 0.0 0.0 - 0.06 K/UL    DF AUTOMATED     METABOLIC PANEL, BASIC    Collection Time: 06/29/18  6:35 PM   Result Value Ref Range    Sodium 141 136 - 145 mmol/L    Potassium 4.9 3.5 - 5.5 mmol/L    Chloride 106 100 - 108 mmol/L    CO2 31 21 - 32 mmol/L    Anion gap 4 3.0 - 18 mmol/L    Glucose 97 74 - 99 mg/dL    BUN 14 7.0 - 18 MG/DL    Creatinine 0.96 0.6 - 1.3 MG/DL    BUN/Creatinine ratio 15 12 - 20      GFR est AA >60 >60 ml/min/1.73m2    GFR est non-AA 55 (L) >60 ml/min/1.73m2    Calcium 9.3 8.5 - 10.1 MG/DL   CARDIAC PANEL,(CK, CKMB & TROPONIN)    Collection Time: 06/29/18  6:35 PM   Result Value Ref Range    CK 82 26 - 192 U/L    CK - MB <1.0 <3.6 ng/ml    CK-MB Index  0.0 - 4.0 %     CALCULATION NOT PERFORMED WHEN RESULT IS BELOW LINEAR LIMIT    Troponin-I, Qt. <0.02 0.0 - 0.045 NG/ML   URINALYSIS W/ RFLX MICROSCOPIC    Collection Time: 06/29/18  7:42 PM   Result Value Ref Range    Color YELLOW      Appearance CLEAR      Specific gravity 1.015 1.005 - 1.030      pH (UA) 7.0 5.0 - 8.0      Protein NEGATIVE  NEG mg/dL    Glucose NEGATIVE  NEG mg/dL    Ketone NEGATIVE  NEG mg/dL    Bilirubin NEGATIVE  NEG      Blood NEGATIVE  NEG      Urobilinogen 1.0 0.2 - 1.0 EU/dL    Nitrites NEGATIVE  NEG      Leukocyte Esterase NEGATIVE  NEG     EKG, 12 LEAD, INITIAL    Collection Time: 06/29/18  8:28 PM   Result Value Ref Range    Ventricular Rate 68 BPM    Atrial Rate 68 BPM    P-R Interval 136 ms    QRS Duration 98 ms    Q-T Interval 400 ms    QTC Calculation (Bezet) 425 ms    Calculated P Axis 33 degrees    Calculated R Axis 19 degrees    Calculated T Axis -10 degrees    Diagnosis       Normal sinus rhythm  Incomplete right bundle branch block  T wave abnormality, consider anterolateral ischemia  Abnormal ECG  When compared with ECG of 09-JAN-2018 18:38,  No significant change was found  Confirmed by Mina Arenas ([phone]) on 6/30/2018 11:24:29 AM     GLUCOSE, POC    Collection Time: 06/30/18  1:06 AM   Result Value Ref Range    Glucose (POC) 119 (H) 70 - 759 mg/dL   METABOLIC PANEL, BASIC    Collection Time: 06/30/18  8:49 AM   Result Value Ref Range    Sodium 143 136 - 145 mmol/L    Potassium 4.1 3.5 - 5.5 mmol/L    Chloride 109 (H) 100 - 108 mmol/L    CO2 27 21 - 32 mmol/L    Anion gap 7 3.0 - 18 mmol/L    Glucose 110 (H) 74 - 99 mg/dL    BUN 9 7.0 - 18 MG/DL    Creatinine 0.61 0.6 - 1.3 MG/DL    BUN/Creatinine ratio 15 12 - 20      GFR est AA >60 >60 ml/min/1.73m2    GFR est non-AA >60 >60 ml/min/1.73m2    Calcium 8.8 8.5 - 10.1 MG/DL   MAGNESIUM    Collection Time: 06/30/18  8:49 AM   Result Value Ref Range    Magnesium 2.3 1.6 - 2.6 mg/dL   PHOSPHORUS    Collection Time: 06/30/18  8:49 AM   Result Value Ref Range    Phosphorus 3.6 2.5 - 4.9 MG/DL   CBC WITH AUTOMATED DIFF    Collection Time: 06/30/18  8:49 AM   Result Value Ref Range    WBC 3.6 (L) 4.6 - 13.2 K/uL    RBC 4.16 (L) 4.20 - 5.30 M/uL    HGB 12.1 12.0 - 16.0 g/dL    HCT 37.9 35.0 - 45.0 %    MCV 91.1 74.0 - 97.0 FL    MCH 29.1 24.0 - 34.0 PG    MCHC 31.9 31.0 - 37.0 g/dL    RDW 13.3 11.6 - 14.5 %    PLATELET 688 931 - 137 K/uL    MPV 10.2 9.2 - 11.8 FL    NEUTROPHILS 55 40 - 73 %    LYMPHOCYTES 36 21 - 52 %    MONOCYTES 5 3 - 10 %    EOSINOPHILS 4 0 - 5 %    BASOPHILS 0 0 - 2 %    ABS. NEUTROPHILS 2.0 1.8 - 8.0 K/UL    ABS. LYMPHOCYTES 1.3 0.9 - 3.6 K/UL    ABS. MONOCYTES 0.2 0.05 - 1.2 K/UL    ABS. EOSINOPHILS 0.2 0.0 - 0.4 K/UL    ABS.  BASOPHILS 0.0 0.0 - 0.06 K/UL    DF AUTOMATED       Additional Data Reviewed:      Signed By: Israel Aschoff, MD     June 30, 2018

## 2018-06-30 NOTE — ROUTINE PROCESS
Primary Nurse Kameron Ch RN and Chaim Hargrove RN performed a dual skin assessment on this patient No impairment noted  Kamran score is 17

## 2018-06-30 NOTE — ROUTINE PROCESS
Bedside and Verbal shift change report given to [employer] (oncoming nurse) by GUILLERMO Deng RN (offgoing nurse). Report given with SBAR, Kardex, MAR and Recent Results.

## 2018-06-30 NOTE — H&P
History & Physical    Patient: Mayo Correa MRN: [id number]  CSN: [id number]    YOB: 1932  Age: 80 y.o. Sex: female      DOA: 6/29/2018    Chief Complaint:   Chief Complaint   Patient presents with    Other          HPI:     Mayo Correa is a 80 y.o.  female who has No PMH except for MCI, NH resident   Pt t was in her normal state of health and took a nap this afternoon but nursing staff had difficulty waking her up and Pt looked drowsy and sleepy with sluggish responsiveness and she was brought to the hospital  In ER, Pt was back to her normal self and was asking why is she here. Has no recollection of what happened. Shortly after, Pt was sleepy again and hard to arouse but then woke and had dinner. Denied feeling sick and had no Sx. Alert and oriented. Family at bed side.  When asked why are you sleepy an not talking, she answered, I am talking to you now and sterted laughing  Family at bed side stated that she had similar episode last year and was found to have UTI  Pt has Normal UA and denies Sx  Pt will be admitted to observation for near Syncopal episode r/o arrhythmia   Denies CP/SOB/fever/abdominal pain and urinary Sx but continues to feel sleepy       Past Medical History:   Diagnosis Date    Arthritis     bilaterlal lower extremeties    Blood in stool 3/2013    GI bleeding        Past Surgical History:   Procedure Laterality Date    HX TONSILLECTOMY         Family History   Problem Relation Age of Onset    Elevated Lipids Mother     Alcohol abuse Father     Elevated Lipids Father        Social History     Social History    Marital status:      Spouse name: N/A    Number of children: N/A    Years of education: N/A     Social History Main Topics    Smoking status: Never Smoker    Smokeless tobacco: Never Used    Alcohol use No    Drug use: No    Sexual activity: Not Asked     Other Topics Concern    None     Social History Narrative       Prior to Admission medications    Medication Sig Start Date End Date Taking? Authorizing Provider   acetaminophen (TYLENOL) 325 mg tablet Take 650 mg by mouth every four (4) hours as needed for Pain. Christopher Michel, MD   aspirin 81 mg tablet Take 81 mg by mouth daily. Historical Provider   ferrous sulfate (IRON) 325 mg (65 mg iron) EC tablet Take 1 Tab by mouth three (3) times daily (with meals). 3/11/13   Xenia Whitt MD       No Known Allergies      Review of Systems  GENERAL: Patient alert, awake and oriented times 3, able to communicate full sentences and not in distress. MCI  Sleepy but not lethargic. Follows command   HEENT: No change in vision, no earache, tinnitus, sore throat or sinus congestion. NECK: No pain or stiffness. PULMONARY: No shortness of breath, cough or wheeze. Cardiovascular: no pnd / orthopnea, no CP  GASTROINTESTINAL: No abdominal pain, nausea, vomiting or diarrhea, melena or bright red blood per rectum. GENITOURINARY: No urinary frequency, urgency, hesitancy or dysuria. MUSCULOSKELETAL: No joint or muscle pain, no back pain, no recent trauma. DERMATOLOGIC: No rash, no itching, no lesions. ENDOCRINE: No polyuria, polydipsia, no heat or cold intolerance. No recent change in weight. HEMATOLOGICAL: No anemia or easy bruising or bleeding. NEUROLOGIC: No headache, seizures, numbness, tingling or weakness. Uses a cane       Physical Exam:     Physical Exam:  Visit Vitals    /79    Pulse 64    Temp 98.6 °F (37 °C)    Resp 17    SpO2 100%      O2 Device: Room air    Temp (24hrs), Av.6 °F (37 °C), Min:98.6 °F (37 °C), Max:98.6 °F (37 °C)             General:  Alert, cooperative, no distress, appears stated age. Sleepy but easy to be awaken then falls back to sleep               Head: Normocephalic, without obvious abnormality, atraumatic. Eyes:  Conjunctivae/corneas clear. PERRL, EOMs intact.    Nose: Nares normal. No drainage or sinus tenderness. Neck: Supple, symmetrical, trachea midline, no adenopathy, thyroid: no enlargement, no carotid bruit and no JVD. Lungs:   Clear to auscultation bilaterally. Heart:  Regular rate and rhythm, S1, S2 normal.     Abdomen: Soft, non-tender. Bowel sounds normal.    Extremities: Extremities normal, atraumatic, no cyanosis or edema. Pulses: 2+ and symmetric all extremities. Skin:  No rashes or lesions   Neurologic: AAOx3, No focal motor or sensory deficit. Labs Reviewed: All lab results for the last 24 hours reviewed.   CT, CXR and EKG    Procedures/imaging: see electronic medical records for all procedures/Xrays and details which were not copied into this note but were reviewed prior to creation of Plan      Assessment/Plan     Active Problems:    Near syncope (6/29/2018)    Pt is admitted for near syncopal episode and difficulty to arouse after sleeping episode for monitoring to r/o arrhythmia  Will get an echo in AM if Normal , pt can be discharged back to NH  CT head is -ve form acute changes  No FNS  Continue ASA      DVT/GI Prophylaxis: Lovenox    Plan of care is discussed in details with Patient/Family at bedside and agreed upon    Alanis Wayne MD  6/29/2018 10:29 PM

## 2018-06-30 NOTE — PROGRESS NOTES
Problem: Falls - Risk of  Goal: *Absence of Falls  Document Brenda Fall Risk and appropriate interventions in the flowsheet.    Outcome: Progressing Towards Goal  Fall Risk Interventions:  Mobility Interventions: Bed/chair exit alarm    Mentation Interventions: Bed/chair exit alarm    Medication Interventions: Bed/chair exit alarm    Elimination Interventions: Call light in reach    History of Falls Interventions: Room close to nurse's station

## 2018-06-30 NOTE — ROUTINE PROCESS
Bedside and Verbal shift change report given to SUMMER Armenta (oncoming nurse) by Fannie Short (offgoing nurse). Report included the following information SBAR, Kardex and Recent Results.

## 2018-07-01 ENCOUNTER — APPOINTMENT (OUTPATIENT)
Dept: NON INVASIVE DIAGNOSTICS | Age: 83
End: 2018-07-01
Attending: INTERNAL MEDICINE
Payer: MEDICARE

## 2018-07-01 LAB
ALBUMIN SERPL-MCNC: 3.1 G/DL (ref 3.4–5)
ALBUMIN/GLOB SERPL: 0.9 {RATIO} (ref 0.8–1.7)
ALP SERPL-CCNC: 66 U/L (ref 45–117)
ALT SERPL-CCNC: 16 U/L (ref 13–56)
ANION GAP SERPL CALC-SCNC: 6 MMOL/L (ref 3–18)
AST SERPL-CCNC: 17 U/L (ref 15–37)
BASOPHILS # BLD: 0 K/UL (ref 0–0.06)
BASOPHILS NFR BLD: 0 % (ref 0–3)
BILIRUB SERPL-MCNC: 0.5 MG/DL (ref 0.2–1)
BUN SERPL-MCNC: 10 MG/DL (ref 7–18)
BUN/CREAT SERPL: 15 (ref 12–20)
CALCIUM SERPL-MCNC: 8.7 MG/DL (ref 8.5–10.1)
CHLORIDE SERPL-SCNC: 108 MMOL/L (ref 100–108)
CO2 SERPL-SCNC: 26 MMOL/L (ref 21–32)
CREAT SERPL-MCNC: 0.67 MG/DL (ref 0.6–1.3)
DIFFERENTIAL METHOD BLD: ABNORMAL
ECHO LV E' LATERAL VELOCITY: 8.78 CM/S
ECHO LV INTERNAL DIMENSION DIASTOLIC: 4.54 CM (ref 3.9–5.3)
ECHO LV INTERNAL DIMENSION SYSTOLIC: 2.29 CM
ECHO LV IVSD: 1.28 CM (ref 0.6–0.9)
ECHO LV MASS 2D: 224.5 G (ref 67–162)
ECHO LV POSTERIOR WALL DIASTOLIC: 1.04 CM (ref 0.6–0.9)
ECHO LVOT DIAM: 1.75 CM
ECHO MV A VELOCITY: 85.42 CM/S
ECHO MV E DECELERATION TIME (DT): 235.5 MS
ECHO MV E VELOCITY: 0.7 CM/S
ECHO MV E/A RATIO: 0.8
ECHO MV E/E' LATERAL: 8
ECHO PVEIN A DURATION: 133.7 MS
ECHO PVEIN A VELOCITY: 29.32 CM/S
ECHO RV TAPSE: 1.99 CM (ref 1.5–2)
EOSINOPHIL # BLD: 0.1 K/UL (ref 0–0.4)
EOSINOPHIL NFR BLD: 3 % (ref 0–5)
ERYTHROCYTE [DISTWIDTH] IN BLOOD BY AUTOMATED COUNT: 13.5 % (ref 11.6–14.5)
GLOBULIN SER CALC-MCNC: 3.3 G/DL (ref 2–4)
GLUCOSE SERPL-MCNC: 77 MG/DL (ref 74–99)
HCT VFR BLD AUTO: 39.5 % (ref 35–45)
HGB BLD-MCNC: 13.1 G/DL (ref 12–16)
LYMPHOCYTES # BLD: 1.2 K/UL (ref 0.8–3.5)
LYMPHOCYTES NFR BLD: 33 % (ref 20–51)
MAGNESIUM SERPL-MCNC: 2.4 MG/DL (ref 1.6–2.6)
MCH RBC QN AUTO: 30.4 PG (ref 24–34)
MCHC RBC AUTO-ENTMCNC: 33.2 G/DL (ref 31–37)
MCV RBC AUTO: 91.6 FL (ref 74–97)
MONOCYTES # BLD: 0.2 K/UL (ref 0–1)
MONOCYTES NFR BLD: 5 % (ref 2–9)
NEUTS BAND NFR BLD MANUAL: 1 % (ref 0–5)
NEUTS SEG # BLD: 2 K/UL (ref 1.8–8)
NEUTS SEG NFR BLD: 58 % (ref 42–75)
PLATELET # BLD AUTO: 148 K/UL (ref 135–420)
PLATELET COMMENTS,PCOM: ABNORMAL
PMV BLD AUTO: 11.5 FL (ref 9.2–11.8)
POTASSIUM SERPL-SCNC: 4.8 MMOL/L (ref 3.5–5.5)
PROT SERPL-MCNC: 6.4 G/DL (ref 6.4–8.2)
RBC # BLD AUTO: 4.31 M/UL (ref 4.2–5.3)
RBC MORPH BLD: ABNORMAL
SODIUM SERPL-SCNC: 140 MMOL/L (ref 136–145)
T4 FREE SERPL-MCNC: 1.1 NG/DL (ref 0.7–1.5)
TSH SERPL DL<=0.05 MIU/L-ACNC: 2.55 UIU/ML (ref 0.36–3.74)
WBC # BLD AUTO: 3.5 K/UL (ref 4.6–13.2)

## 2018-07-01 PROCEDURE — 74011250636 HC RX REV CODE- 250/636: Performed by: INTERNAL MEDICINE

## 2018-07-01 PROCEDURE — 74011250637 HC RX REV CODE- 250/637: Performed by: HOSPITALIST

## 2018-07-01 PROCEDURE — 96361 HYDRATE IV INFUSION ADD-ON: CPT

## 2018-07-01 PROCEDURE — 74011250637 HC RX REV CODE- 250/637: Performed by: INTERNAL MEDICINE

## 2018-07-01 PROCEDURE — 36415 COLL VENOUS BLD VENIPUNCTURE: CPT | Performed by: EMERGENCY MEDICINE

## 2018-07-01 PROCEDURE — 84443 ASSAY THYROID STIM HORMONE: CPT | Performed by: HOSPITALIST

## 2018-07-01 PROCEDURE — 84439 ASSAY OF FREE THYROXINE: CPT | Performed by: HOSPITALIST

## 2018-07-01 PROCEDURE — 80053 COMPREHEN METABOLIC PANEL: CPT | Performed by: EMERGENCY MEDICINE

## 2018-07-01 PROCEDURE — 93306 TTE W/DOPPLER COMPLETE: CPT

## 2018-07-01 PROCEDURE — 85025 COMPLETE CBC W/AUTO DIFF WBC: CPT | Performed by: EMERGENCY MEDICINE

## 2018-07-01 PROCEDURE — 99218 HC RM OBSERVATION: CPT

## 2018-07-01 PROCEDURE — 96372 THER/PROPH/DIAG INJ SC/IM: CPT

## 2018-07-01 PROCEDURE — 83735 ASSAY OF MAGNESIUM: CPT | Performed by: EMERGENCY MEDICINE

## 2018-07-01 PROCEDURE — 97162 PT EVAL MOD COMPLEX 30 MIN: CPT

## 2018-07-01 PROCEDURE — 77030038269 HC DRN EXT URIN PURWCK BARD -A

## 2018-07-01 RX ORDER — DONEPEZIL HYDROCHLORIDE 5 MG/1
10 TABLET, FILM COATED ORAL
Status: DISCONTINUED | OUTPATIENT
Start: 2018-07-01 | End: 2018-07-02 | Stop reason: HOSPADM

## 2018-07-01 RX ADMIN — ENOXAPARIN SODIUM 40 MG: 40 INJECTION, SOLUTION INTRAVENOUS; SUBCUTANEOUS at 02:02

## 2018-07-01 RX ADMIN — FERROUS SULFATE TAB 325 MG (65 MG ELEMENTAL FE) 325 MG: 325 (65 FE) TAB at 09:06

## 2018-07-01 RX ADMIN — ASPIRIN 81 MG: 81 TABLET, COATED ORAL at 09:06

## 2018-07-01 RX ADMIN — DONEPEZIL HYDROCHLORIDE 10 MG: 5 TABLET, FILM COATED ORAL at 22:12

## 2018-07-01 NOTE — ROUTINE PROCESS
Bedside and Verbal shift change report given to SUMMER Armenta (oncoming nurse) by Noemy Lu (offgoing nurse). Report included the following information SBAR, Kardex and Recent Results.

## 2018-07-01 NOTE — ROUTINE PROCESS
Patient refused to have vital signs taken at this time. No s/s of distress noted. Deny any c/o pain. Resting in bed.

## 2018-07-01 NOTE — PROGRESS NOTES
Problem: Falls - Risk of  Goal: *Absence of Falls  Document Brenda Fall Risk and appropriate interventions in the flowsheet.    Outcome: Progressing Towards Goal  Fall Risk Interventions:  Mobility Interventions: Assess mobility with egress test, Bed/chair exit alarm, Communicate number of staff needed for ambulation/transfer, OT consult for ADLs, PT Consult for mobility concerns, PT Consult for assist device competence, Strengthening exercises (ROM-active/passive), Utilize walker, cane, or other assitive device    Mentation Interventions: Adequate sleep, hydration, pain control, Bed/chair exit alarm, Door open when patient unattended, Eyeglasses and hearing aids, Increase mobility, More frequent rounding, Reorient patient, Toileting rounds    Medication Interventions: Bed/chair exit alarm    Elimination Interventions: Bed/chair exit alarm, Call light in reach, Elevated toilet seat, Patient to call for help with toileting needs, Toilet paper/wipes in reach, Toileting schedule/hourly rounds    History of Falls Interventions: Bed/chair exit alarm, Consult care management for discharge planning, Door open when patient unattended

## 2018-07-01 NOTE — PROGRESS NOTES
NUTRITION    Nutrition Consult: General Nutrition Management & Supplements     RECOMMENDATIONS / PLAN:     - Add supplements: Ensure Enlive, once daily  - Continue RD inpatient monitoring and evaluation. NUTRITION INTERVENTIONS & DIAGNOSIS:     [x] Meals/snacks: modify composition  [x] Medical food supplement therapy: initiate    Nutrition Diagnosis: Predicted inadequate energy intake related to predicted appetite as evidenced by pt with variable meal intake since admission (60%-95%). ASSESSMENT:     Pt unavailable during visit. Per chart hx review pt with fair/good meal intake. Tolerating diet.      Average po intake adequate to meet patients estimated nutritional needs:   [] Yes     [] No   [x] Unable to determine at this time    Diet: DIET CARDIAC Regular      Food Allergies: NKFA  Current Appetite:   [] Good     [] Fair     [] Poor     [x] Other: unknown  Appetite/meal intake prior to admission:   [] Good     [] Fair     [] Poor     [x] Other: unknown  Feeding Limitations:  [] Swallowing difficulty    [] Chewing difficulty    [] Other:  Current Meal Intake: Patient Vitals for the past 100 hrs:   % Diet Eaten   06/30/18 1315 95 %   06/30/18 0900 60 %       BM: unknown  Skin Integrity: WDL   Edema: None  Pertinent Medications: Reviewed: NS at 75 mL/hr, colace, ferrous sulfate    Recent Labs      07/01/18   0230  06/30/18   0849  06/29/18   1835   NA  140  143  141   K  4.8  4.1  4.9   CL  108  109*  106   CO2  26  27  31   GLU  77  110*  97   BUN  10  9  14   CREA  0.67  0.61  0.96   CA  8.7  8.8  9.3   MG  2.4  2.3   --    PHOS   --   3.6   --    ALB  3.1*   --    --    SGOT  17   --    --    ALT  16   --    --        Intake/Output Summary (Last 24 hours) at 07/01/18 1146  Last data filed at 06/30/18 1502   Gross per 24 hour   Intake          1106.67 ml   Output                0 ml   Net          1106.67 ml       Anthropometrics:  Ht Readings from Last 1 Encounters:   03/25/15 5' 8\" (1.727 m)     Last 3 Recorded Weights in this Encounter    06/30/18 0043 07/01/18 0514   Weight: 71.5 kg (157 lb 9.6 oz) 73.3 kg (161 lb 11.2 oz)     Body mass index is 24.59 kg/(m^2). Weight History: Fluctuations noted in weight hx per chart review. -19 lbs, 10.5% x 6 months PTA per chart hx review. Weight Metrics 7/1/2018 1/9/2018 3/28/2015 5/28/2013 5/21/2013 3/19/2013 3/11/2013   Weight 161 lb 11.2 oz 180 lb 157 lb 6.4 oz - 155 lb 163 lb 2.3 oz 148 lb 14.4 oz   BMI 24.59 kg/m2 27.37 kg/m2 23.94 kg/m2 24.27 kg/m2 - 26.34 kg/m2 24.04 kg/m2        Admitting Diagnosis: Syncope  Pertinent PMHx: No pertinent past medical hx    Education Needs:        [x] None identified  [] Identified - Not appropriate at this time  []  Identified and addressed - refer to education log  Learning Limitations:   [x] None identified  [] Identified    Cultural, Yarsanism & ethnic food preferences:  [x] None identified    [] Identified and addressed     ESTIMATED NUTRITION NEEDS:     Calories: 2446-1980 kcal (MSJx1.2-1.3) based on  [x] Actual BW: 73 kg     [] IBW   Protein: 58-73 gm (0.8-1 gm/kg) based on  [x] Actual BW      [] IBW   Fluid: 1 mL/kcal     MONITORING & EVALUATION:     Nutrition Goal(s):   1. Po intake of meals will meet >75% of patient estimated nutritional needs within the next 7 days.   Outcome:  [] Met/Ongoing    []  Not Met    [x] New/Initial Goal     Monitoring:   [x] Food and beverage intake   [x] Diet order   [x] Nutrition-focused physical findings   [x] Treatment/therapy   [] Weight   [] Enteral nutrition intake        Previous Recommendations (for follow-up assessments only):     []   Implemented       []   Not Implemented (RD to address)      [] No Longer Appropriate     [] No Recommendation Made     Discharge Planning: cardiac diet   [x] Participated in care planning, discharge planning, & interdisciplinary rounds as appropriate      Duke Aquino RD   Pager: [phone]

## 2018-07-01 NOTE — PROGRESS NOTES
Problem: Mobility Impaired (Adult and Pediatric)  Goal: *Acute Goals and Plan of Care (Insert Text)  Physical Therapy Goals  Initiated 7/1/2018 and to be accomplished within 7 day(s)  1. Patient will move from supine to sit and sit to supine , scoot up and down and roll side to side in bed with supervision/set-up. 2.  Patient will transfer from bed to chair and chair to bed with supervision/set-up using the least restrictive device. 3.  Patient will perform sit to stand with supervision/set-up. 4.  Patient will ambulate with supervision/set-up for 25-50 feet with the least restrictive device. Outcome: Progressing Towards Goal  physical Therapy EVALUATION    Patient: Vickie Sotelo (37 y.o. female)  Date: 7/1/2018  Primary Diagnosis: Syncope        Precautions:   Fall    ASSESSMENT :  PT orders received and patient cleared by nursing to participate with therapy. Patient is a 80 y.o. female admitted to the hospital due to syncope from a nursing facility. Patient consents to PT evaluation and treatment. Pt is confused and states she lives at home alone but per EMR and nurse pt lives in a facility where she was ambulating with a RW. Pt required mod A for supine to and from sit. Sit to stands mod/max A. Gait 3 feet laterally max A for balance. Pt ended therapy supine in bed with a new gown donned and all needs met. Patient will benefit from skilled intervention to address the above impairments and increase functional independence.   Patients rehabilitation potential is considered to be Fair  Factors which may influence rehabilitation potential include:   []         None noted  [x]         Mental ability/status  [x]         Medical condition  []         Home/family situation and support systems  []         Safety awareness  []         Pain tolerance/management  []         Other:      PLAN :  Recommendations and Planned Interventions:  [x]           Bed Mobility Training             [x] Neuromusclur Re-Education  [x]           Transfer Training                   []    Orthotic/Prosthetic Training  [x]           Gait Training                          []    Modalities  [x]           Therapeutic Exercises          []    Edema Management/Control  [x]           Therapeutic Activities            [x]    Patient and Family Training/Education  []           Other (comment):    Frequency/Duration: Patient will be followed by physical therapy 1-2 times per day to address goals. Discharge Recommendations: Skilled Nursing Facility  Further Equipment Recommendations for Discharge: rolling walker     SUBJECTIVE:   Patient stated I walk with a walker.     OBJECTIVE DATA SUMMARY:     Past Medical History:   Diagnosis Date    Arthritis     bilaterlal lower extremeties    Blood in stool 3/2013    GI bleeding      Past Surgical History:   Procedure Laterality Date    HX TONSILLECTOMY       Barriers to Learning/Limitations: yes;  cognitive  Compensate with: Visual Cues, Verbal Cues and Tactile Cues  Prior Level of Function/Home Situation: Independent per pt with all mobility including gait using rollator. Pt lives in nursing home per EMR and per family from nursing report pt does ambulate with a walker. Home Situation  Home Environment: [address] [place] [address] Name: [facility]  One/Two Story Residence: One story  Living Alone: No  Support Systems: Skilled nursing facility  Patient Expects to be Discharged to[de-identified] Skilled nursing facility  Current DME Used/Available at Home: Shower chair, Walker, rolling, Commode, bedside, Grab bars  Critical Behavior:  Neurologic State: Alert;Confused  Psychosocial  Patient Behaviors: Calm;Confused; Cooperative  Purposeful Interaction: Yes  Pt Identified Daily Priority: Clinical issues (comment)  Caritas Process: Nurture loving kindness;Establish trust;Teaching/learning; Attend basic human needs  Caring Interventions: Reassure; Therapeutic modalities  Reassure: Therapeutic listening; Informing;Caring rounds  Therapeutic Modalities: Intentional therapeutic touch  Strength:    Strength: Generally decreased, functional (B LE) R LE weaker than L  Tone & Sensation:   Tone: Normal (B LE)  Sensation: Intact (B LE)   Range Of Motion:  AROM: Generally decreased, functional (B LE)  Functional Mobility:  Bed Mobility:  Supine to Sit: Moderate assistance  Sit to Supine: Moderate assistance  Transfers:  Sit to Stand: Moderate assistance;Maximum assistance  Stand to Sit: Moderate assistance  Balance:   Sitting: Impaired; With support  Sitting - Static: Good (unsupported)  Sitting - Dynamic: Fair (occasional)  Standing: Impaired; With support  Standing - Static: Poor  Standing - Dynamic : Poor  Ambulation/Gait Training:  Distance (ft): 3 Feet (ft) (laterally)  Assistive Device: Walker, rolling  Ambulation - Level of Assistance: Moderate assistance  Speed/Anna: Shuffled; Slow  Pain:  Pre: 0/10    Post: 0/10    Activity Tolerance:   fair  Please refer to the flowsheet for vital signs taken during this treatment. After treatment:   [] Patient left in no apparent distress sitting up in chair  [] Patient left sitting on EOB  [x] Patient left in no apparent distress in bed  [] Patient declined to be OOB at this time due to    [x] Call bell left within reach  [x] Nursing notified(Joanie)  [] Caregiver present  [x] Bed alarm activated  [x] Personal items in reach     COMMUNICATION/EDUCATION:   [x]         Fall prevention education was provided and the patient/caregiver indicated understanding. [x]         Patient/family have participated as able in goal setting and plan of care. [x]         Patient/family agree to work toward stated goals and plan of care. []         Patient understands intent and goals of therapy, but is neutral about his/her participation. []         Patient is unable to participate in goal setting and plan of care.     Thank you for this referral.  Tracy Morales, PT, DPT   Time Calculation: 14 mins      Mobility  Current  CL= 60-79%   Goal  CI= 1-19%. The severity rating is based on the Level of Assistance required for Functional Mobility and ADLs.     Eval Complexity: History: MEDIUM  Complexity : 1-2 comorbidities / personal factors will impact the outcome/ POC Exam:HIGH Complexity : 4+ Standardized tests and measures addressing body structure, function, activity limitation and / or participation in recreation  Presentation: MEDIUM Complexity : Evolving with changing characteristics  Clinical Decision Making:Medium Complexity   Overall Complexity:MEDIUM

## 2018-07-01 NOTE — PROGRESS NOTES
[facility] Hospitalist Group  Progress Note    Patient: Pooja Shipley Age: 80 y.o. : 1932 MR#: [id number] SSN: [id number]  Date/Time: 2018    Subjective:     Patient sitting in bed in NAD, AAOx2, follows some commands. Son and daughter in law at bedside    Assessment/Plan:     1- ? Near syncope vs sleeping, family thinks she was in deep sleep boz she woke up and spoke to her son. 2- Dementia, per family baseline AAOx2  3- advanced age  3.  Asymptomatic bradycardia     PLAN  Will get TFT  Check Orthostatics, d/w RN  Monitor on tele  Echo noted  PT, Ot eval pending   D/w patient and family, d/w RN  Full code   consult  Back to assisted living in am if remains stable         Case discussed with:  []Patient  [x]Family  [x]Nursing  []Case Management  DVT Prophylaxis:  [x]Lovenox  []Hep SQ  []SCDs  []Coumadin   []On Heparin gtt    Objective:   VS:   Visit Vitals    /67 (BP 1 Location: Left arm, BP Patient Position: Supine)    Pulse (!) 59    Temp 97.6 °F (36.4 °C)    Resp 16    Wt 73 kg (161 lb)    SpO2 99%    BMI 24.48 kg/m2      Tmax/24hrs: Temp (24hrs), Av.5 °F (36.4 °C), Min:97.3 °F (36.3 °C), Max:98 °F (36.7 °C)    Input/Output:     Intake/Output Summary (Last 24 hours) at 18 1435  Last data filed at 18 1254   Gross per 24 hour   Intake          1106.67 ml   Output                0 ml   Net          1106.67 ml       General:  Awake, follows commands  Cardiovascular:  S1S2+, RRR  Pulmonary:  CTA b/l  GI:  Soft, BS+, NT, ND  Extremities:  trace edema  Neuro: AAOx2, moves all ext       Labs:    Recent Results (from the past 24 hour(s))   CBC WITH AUTOMATED DIFF    Collection Time: 18  2:30 AM   Result Value Ref Range    WBC 3.5 (L) 4.6 - 13.2 K/uL    RBC 4.31 4.20 - 5.30 M/uL    HGB 13.1 12.0 - 16.0 g/dL    HCT 39.5 35.0 - 45.0 %    MCV 91.6 74.0 - 97.0 FL    MCH 30.4 24.0 - 34.0 PG    MCHC 33.2 31.0 - 37.0 g/dL    RDW 13.5 11.6 - 14.5 %    PLATELET 500 268 - 087 K/uL    MPV 11.5 9.2 - 11.8 FL    NEUTROPHILS 58 42 - 75 %    BAND NEUTROPHILS 1 0 - 5 %    LYMPHOCYTES 33 20 - 51 %    MONOCYTES 5 2 - 9 %    EOSINOPHILS 3 0 - 5 %    BASOPHILS 0 0 - 3 %    ABS. NEUTROPHILS 2.0 1.8 - 8.0 K/UL    ABS. LYMPHOCYTES 1.2 0.8 - 3.5 K/UL    ABS. MONOCYTES 0.2 0 - 1.0 K/UL    ABS. EOSINOPHILS 0.1 0.0 - 0.4 K/UL    ABS. BASOPHILS 0.0 0.0 - 0.06 K/UL    DF MANUAL      PLATELET COMMENTS ADEQUATE PLATELETS      RBC COMMENTS NORMOCYTIC, NORMOCHROMIC     METABOLIC PANEL, COMPREHENSIVE    Collection Time: 07/01/18  2:30 AM   Result Value Ref Range    Sodium 140 136 - 145 mmol/L    Potassium 4.8 3.5 - 5.5 mmol/L    Chloride 108 100 - 108 mmol/L    CO2 26 21 - 32 mmol/L    Anion gap 6 3.0 - 18 mmol/L    Glucose 77 74 - 99 mg/dL    BUN 10 7.0 - 18 MG/DL    Creatinine 0.67 0.6 - 1.3 MG/DL    BUN/Creatinine ratio 15 12 - 20      GFR est AA >60 >60 ml/min/1.73m2    GFR est non-AA >60 >60 ml/min/1.73m2    Calcium 8.7 8.5 - 10.1 MG/DL    Bilirubin, total 0.5 0.2 - 1.0 MG/DL    ALT (SGPT) 16 13 - 56 U/L    AST (SGOT) 17 15 - 37 U/L    Alk.  phosphatase 66 45 - 117 U/L    Protein, total 6.4 6.4 - 8.2 g/dL    Albumin 3.1 (L) 3.4 - 5.0 g/dL    Globulin 3.3 2.0 - 4.0 g/dL    A-G Ratio 0.9 0.8 - 1.7     MAGNESIUM    Collection Time: 07/01/18  2:30 AM   Result Value Ref Range    Magnesium 2.4 1.6 - 2.6 mg/dL   ECHO ADULT COMPLETE    Collection Time: 07/01/18 12:31 PM   Result Value Ref Range    LVIDd 4.54 3.9 - 5.3 cm    LVPWd 1.04 (A) 0.6 - 0.9 cm    LVIDs 2.29 cm    IVSd 1.28 (A) 0.6 - 0.9 cm    LVOT d 1.75 cm    LV E' Lateral Velocity 8.78 cm/s    MV A Mckay 85.42 cm/s    MV E Mckay 0.70 cm/s    MV E/A 0.8     LV Mass .5 (A) 67 - 162 g    E/E' lateral 8.0     Mitral Valve E Wave Deceleration Time 235.5 ms    Tapse 1.99 1.5 - 2.0 cm    Pulmonary Vein \"A\" Wave Velocity 29.32 cm/s    P Vein A Dur 133.7 ms     Additional Data Reviewed:      Signed By: Autumn Swartz Anuj Potter MD     July 1, 2018

## 2018-07-02 VITALS
SYSTOLIC BLOOD PRESSURE: 126 MMHG | DIASTOLIC BLOOD PRESSURE: 71 MMHG | HEIGHT: 66 IN | TEMPERATURE: 97.4 F | BODY MASS INDEX: 25.88 KG/M2 | HEART RATE: 78 BPM | OXYGEN SATURATION: 100 % | WEIGHT: 161 LBS | RESPIRATION RATE: 16 BRPM

## 2018-07-02 LAB
ANION GAP SERPL CALC-SCNC: 8 MMOL/L (ref 3–18)
BASOPHILS # BLD: 0 K/UL (ref 0–0.1)
BASOPHILS NFR BLD: 1 % (ref 0–2)
BUN SERPL-MCNC: 12 MG/DL (ref 7–18)
BUN/CREAT SERPL: 18 (ref 12–20)
CALCIUM SERPL-MCNC: 9.1 MG/DL (ref 8.5–10.1)
CHLORIDE SERPL-SCNC: 106 MMOL/L (ref 100–108)
CO2 SERPL-SCNC: 27 MMOL/L (ref 21–32)
CREAT SERPL-MCNC: 0.68 MG/DL (ref 0.6–1.3)
DIFFERENTIAL METHOD BLD: ABNORMAL
EOSINOPHIL # BLD: 0.2 K/UL (ref 0–0.4)
EOSINOPHIL NFR BLD: 3 % (ref 0–5)
ERYTHROCYTE [DISTWIDTH] IN BLOOD BY AUTOMATED COUNT: 13.2 % (ref 11.6–14.5)
GLUCOSE SERPL-MCNC: 85 MG/DL (ref 74–99)
HCT VFR BLD AUTO: 37.6 % (ref 35–45)
HGB BLD-MCNC: 12.3 G/DL (ref 12–16)
LYMPHOCYTES # BLD: 1.6 K/UL (ref 0.9–3.6)
LYMPHOCYTES NFR BLD: 37 % (ref 21–52)
MCH RBC QN AUTO: 29.3 PG (ref 24–34)
MCHC RBC AUTO-ENTMCNC: 32.7 G/DL (ref 31–37)
MCV RBC AUTO: 89.5 FL (ref 74–97)
MONOCYTES # BLD: 0.3 K/UL (ref 0.05–1.2)
MONOCYTES NFR BLD: 7 % (ref 3–10)
NEUTS SEG # BLD: 2.3 K/UL (ref 1.8–8)
NEUTS SEG NFR BLD: 52 % (ref 40–73)
PLATELET # BLD AUTO: 178 K/UL (ref 135–420)
PMV BLD AUTO: 10.1 FL (ref 9.2–11.8)
POTASSIUM SERPL-SCNC: 4.1 MMOL/L (ref 3.5–5.5)
RBC # BLD AUTO: 4.2 M/UL (ref 4.2–5.3)
SODIUM SERPL-SCNC: 141 MMOL/L (ref 136–145)
WBC # BLD AUTO: 4.4 K/UL (ref 4.6–13.2)

## 2018-07-02 PROCEDURE — 74011250636 HC RX REV CODE- 250/636: Performed by: INTERNAL MEDICINE

## 2018-07-02 PROCEDURE — 74011250637 HC RX REV CODE- 250/637: Performed by: INTERNAL MEDICINE

## 2018-07-02 PROCEDURE — 97166 OT EVAL MOD COMPLEX 45 MIN: CPT

## 2018-07-02 PROCEDURE — 97535 SELF CARE MNGMENT TRAINING: CPT

## 2018-07-02 PROCEDURE — 80048 BASIC METABOLIC PNL TOTAL CA: CPT | Performed by: INTERNAL MEDICINE

## 2018-07-02 PROCEDURE — 99218 HC RM OBSERVATION: CPT

## 2018-07-02 PROCEDURE — 36415 COLL VENOUS BLD VENIPUNCTURE: CPT | Performed by: INTERNAL MEDICINE

## 2018-07-02 PROCEDURE — 96372 THER/PROPH/DIAG INJ SC/IM: CPT

## 2018-07-02 PROCEDURE — 85025 COMPLETE CBC W/AUTO DIFF WBC: CPT | Performed by: INTERNAL MEDICINE

## 2018-07-02 RX ORDER — DONEPEZIL HYDROCHLORIDE 10 MG/1
10 TABLET, FILM COATED ORAL
Qty: 30 TAB | Refills: 0 | Status: SHIPPED | OUTPATIENT
Start: 2018-07-02

## 2018-07-02 RX ORDER — ASPIRIN 81 MG/1
81 TABLET ORAL DAILY
Qty: 30 TAB | Refills: 0 | Status: SHIPPED | OUTPATIENT
Start: 2018-07-03

## 2018-07-02 RX ORDER — ACETAMINOPHEN 325 MG/1
650 TABLET ORAL
Qty: 10 TAB | Refills: 0 | Status: SHIPPED | OUTPATIENT
Start: 2018-07-02

## 2018-07-02 RX ORDER — FERROUS SULFATE 325(65) MG
325 TABLET, DELAYED RELEASE (ENTERIC COATED) ORAL
Qty: 90 TAB | Refills: 0 | Status: SHIPPED | OUTPATIENT
Start: 2018-07-02

## 2018-07-02 RX ADMIN — ENOXAPARIN SODIUM 40 MG: 40 INJECTION, SOLUTION INTRAVENOUS; SUBCUTANEOUS at 01:12

## 2018-07-02 RX ADMIN — ASPIRIN 81 MG: 81 TABLET, COATED ORAL at 09:06

## 2018-07-02 RX ADMIN — FERROUS SULFATE TAB 325 MG (65 MG ELEMENTAL FE) 325 MG: 325 (65 FE) TAB at 09:06

## 2018-07-02 NOTE — PROGRESS NOTES
CMS was asked to arrange transportation to [facility] ([facility] per Ron Elizabeth RN (care manager). CMS spoke with \"[facility]\" representative (Quintin Ross), and scheduled a p/u time of 0, via stretcher. \"[facility]\" packet was initiated and placed in the pt chart for completion. Ron Elizabeth RN (care manager) were made aware of the scheduled p/u.

## 2018-07-02 NOTE — PROGRESS NOTES
Care Management Interventions  PCP Verified by CM: Yes  Transition of Care Consult (CM Consult): Assisted Living, [address]: No  Reason Outside [place]: Patient already serviced by other home care/hospice agency  Current Support Network: Assisted Living  Confirm Follow Up Transport: Family  Plan discussed with Pt/Family/Caregiver: Yes  Freedom of Choice Offered: Yes  Discharge Location  Discharge Placement: Assisted Living     Reason for Admission:   syncope                  RRAT Score:     15             Do you (patient/family) have any concerns for transition/discharge? no              Plan for utilizing home health:     yes    Likelihood of readmission? Yellow/moderate            Transition of Care Plan:      Pt is from [facility] and son Betty Keller wants pt to return there. He gave telephone consent for pt to return there and be serviced by their home health. Called pt's son Betty Keller and he states he wants his [facility]. Called [facility] and admissions is not available at the moment. 1146:  Called [facility], admissions still not available. Left a message  1020:  Called [facility] again and still staff in admissions not available. Left a message. 1108:  Called  [facility] and CM directed to [facility] [place],  and message was left. [address] and spoke with [place] who asked that prescriptions and HH orders be faxed to [phone]. Dr Ayaz Romero informed at [phone]:  [facility] orders and discharge summary faxed to [address]. Waiting for prescriptions to be signed. Informed Joseph Cervantes of [facility] and also transportation pick of time for 3:30pm.  Informed pt's son Martinez Bettencourt and nursing of  time. 1430:  Prescriptions have been faxed to [facility].

## 2018-07-02 NOTE — DISCHARGE INSTRUCTIONS
Discharge Instructions    Patient: Maria Elena Mae MRN: [id number]  CSN: [id number]    YOB: 1932  Age: 80 y.o. Sex: female    DOA: 6/29/2018 LOS:  LOS: 0 days   Discharge Date:      DIET:  Cardiac Diet    ACTIVITY: Activity as tolerated  Home health care for Skilled care for Hypertension and medication management    ·    PT/OT consult      ADDITIONAL INFORMATION: If you experience any of the following symptoms but not limited to Fever, chills, nausea, vomiting, diarrhea, change in mentation, falling, bleeding, shortness of breath, chest pain, please call your primary care physician or return to the emergency room if you cannot get hold of your doctor:     FOLLOW UP CARE:  Dr. Jc MD in 5 days. Please call and set up an appointment.       Memo Vyas MD  7/2/2018 9:13 AM

## 2018-07-02 NOTE — PROGRESS NOTES
Problem: Falls - Risk of  Goal: *Absence of Falls  Document Brenda Fall Risk and appropriate interventions in the flowsheet.    Outcome: Progressing Towards Goal  Fall Risk Interventions:  Mobility Interventions: Assess mobility with egress test, Bed/chair exit alarm, Communicate number of staff needed for ambulation/transfer, OT consult for ADLs, Patient to call before getting OOB, PT Consult for mobility concerns, PT Consult for assist device competence, Strengthening exercises (ROM-active/passive), Utilize walker, cane, or other assitive device    Mentation Interventions: Adequate sleep, hydration, pain control, Bed/chair exit alarm, Door open when patient unattended, Eyeglasses and hearing aids, Family/sitter at bedside, Increase mobility, More frequent rounding, Reorient patient, Toileting rounds    Medication Interventions: Bed/chair exit alarm, Patient to call before getting OOB, Teach patient to arise slowly    Elimination Interventions: Bed/chair exit alarm, Call light in reach, Elevated toilet seat, Patient to call for help with toileting needs, Toilet paper/wipes in reach, Toileting schedule/hourly rounds    History of Falls Interventions: Bed/chair exit alarm, Consult care management for discharge planning, Door open when patient unattended

## 2018-07-02 NOTE — DISCHARGE SUMMARY
[address] Lupe Medina  MR#: [id number]  : 1932  ACCOUNT #: [de-identified]   ADMIT DATE: 2018  DISCHARGE DATE: 2018    DATE OF TRANSFER:  2018    PRIMARY CARE PHYSICIAN:  Dr. Nydia Barlow. DISPOSITION:  Transfer to assisted living with home health care. TRANSFER CONDITION:  Stable. TRANSFER DIAGNOSES:    1. Concern for near syncope versus deep sleep, workup including CT was negative. 2.  Dementia with baseline mental status of alert, oriented x1-2  3. Advanced age. 4.  Asymptomatic bradycardia. DISCHARGE MEDICATIONS:  Aspirin 81 mg daily, Aricept 10 mg a day at bedtime, iron 325 mg 3 times daily, Tylenol 650 mg q.4h. p.r.n. MAJOR INVESTIGATIONS IN HOSPITAL STAY:  Patient underwent a CT head on admission which showed no acute intracranial abnormality. The patient also had an echocardiogram which showed ejection fraction of around 61-65%, no regional wall motion abnormalities. Trace mitral regurgitation was noted. HOSPITAL COURSE:  An 57-year-old -American female who was sent from the [facility] after she was not able to be woke up by the staff. Within a few minutes her son called and the patient actually spoke to him. Patient was sent to the ED, where she had a CTA which was negative. Given there was no clear history from the staffing or from the family, given the poor history, the patient was admitted for concern for near syncope. Patient had orthostatics done which were negative. The patient was monitored with telemonitoring. She had some bradycardia, but no pauses. Heart rate remained in 48-60 range and she remained asymptomatic. No dizziness. Blood pressure and pulses remained stable. Thyroid function tests were done which were also within normal limits. Urinalysis was done, negative for UTI. She did not have any other complaints. No signs of infection, no white count. It was thought that the patient most likely was in a deep sleep, and she took a while to wake up. Otherwise, there was no other indication or need for any further workup. Discussed with the family. Per family, the patient is at her baseline mental status now, patient is asymptomatic. She is confused though. She is hemodynamically and medically stable for transfer. The patient will be transferred to the assisted living today. Patient was seen and removed and evaluated with physical therapy, who recommended SNF placement. I discussed with the patient's son, Linda Espinoza, over the phone. He prefers patient going back to the [facility] with therapy, which she was getting before coming here. Discussed with him about the risks, including risk of fall. He understands, and prefers her going to the assisted living.  is currently working with assisted living if she can be transferred back today. For transfer instructions, followup appointments and physical exam, please refer to the previous discharge summary.       Bindu Wayne MD       BT/HN  D: 07/02/2018 11:58     T: 07/02/2018 18:15  JOB #: [id number]  CC: Cari Short MD

## 2018-07-02 NOTE — PROGRESS NOTES
Problem: Self Care Deficits Care Plan (Adult)  Goal: *Acute Goals and Plan of Care (Insert Text)  Occupational Therapy EVALUATION/discharge    Patient: Shaina Winston (43 y.o. female)  Date: 7/2/2018  Primary Diagnosis: Syncope        Precautions:  Fall    ASSESSMENT AND RECOMMENDATIONS:  Based on the objective data described below, the patient presents with deficits in self care ADLs, IADLs participation, functional transfers, UE strength, and endurance. Patient is unreliable for as a historian due to confusion. PLOF was unable to be obtained on current date as well as patient's home dc environment. Patient was alert and oriented x1 (person/age) as well as pleasant during evaluation. She was able to follow simple step verbal commands. Noticed preservation with self care grooming of oral care and required hard stop to discontinue perseverated behavior. Performed bed mobility supine to eob mod/max A with vcs for proper hand/foot sequence. Static sit eob with left lateral lean for prolong duration until self corrected. Self care performed eob Min A for oral care and setup for hand wash. Skilled occupational therapy is not indicated at this time. Discharge Recommendations: Guzman Woodall  Further Equipment Recommendations for Discharge: N/A      Barriers to Learning/Limitations: yes;  physical and altered mental status (i.e.Sedation, Confusion)  Compensate with: visual, verbal, tactile, kinesthetic cues/model     COMPLEXITY     Eval Complexity: History: LOW Complexity : Brief history review ; Examination: MEDIUM Complexity : 3-5 performance deficits relating to physical, cognitive , or psychosocial skils that result in activity limitations and / or participation restrictions; Decision Making:MEDIUM Complexity : Patient may present with comorbidities that affect occupational performnce.  Miniml to moderate modification of tasks or assistance (eg, physical or verbal ) with assesment(s) is necessary to enable patient to complete evaluation  Assessment: Moderate Complexity        G-CODES:     Self Care  Current  CK= 40-59%   Goal  CK= 40-59%   D/C  CK= 40-59%. The severity rating is based on the Level of Assistance required for Functional Mobility and ADLs. SUBJECTIVE:   Patient stated Debbie Hatch is no taste to it, when asked does she dress herself.     OBJECTIVE DATA SUMMARY:     Past Medical History:   Diagnosis Date    Arthritis     bilaterlal lower extremeties    Blood in stool 3/2013    GI bleeding      Past Surgical History:   Procedure Laterality Date    HX TONSILLECTOMY       Prior Level of Function/Home Situation:   Home Situation  Home Environment: Private residence  [address] [place] [address] Name: [facility]  One/Two Story Residence: One story  Living Alone: No  Support Systems: Family member(s)  Patient Expects to be Discharged to[de-identified] Skilled nursing facility  Current DME Used/Available at Home: Shower chair, Walker, rolling, Commode, bedside, Grab bars  [x]     Right hand dominant   []     Left hand dominant  Cognitive/Behavioral Status:  Neurologic State: Alert;Confused  Orientation Level: Appropriate for age;Disoriented to person  Cognition: Follows commands; Impaired decision making  Safety/Judgement: Decreased awareness of environment    Skin: appears intact    Edema: none noted    Vision/Perceptual:    Appears intact for near/far vision    Coordination:  Coordination: Generally decreased, functional  Fine Motor Skills-Upper: Left Intact; Right Intact (UEs)    Gross Motor Skills-Upper: Left Intact; Right Intact (UEs)    Balance:  Sitting - Static: Good (unsupported)  Sitting - Dynamic: Good (unsupported)    Strength:  Strength: Generally decreased, functional    Tone & Sensation:  Tone: Normal  Sensation: Intact    Range of Motion:  AROM: Generally decreased, functional    Functional Mobility and Transfers for ADLs:  Bed Mobility:   Supine to sit: Min A  Sit to supine: Min A     ADL Assessment:  Feeding: Setup    Oral Facial Hygiene/Grooming: Stand-by assistance    Bathing: Moderate assistance;Maximum assistance    Upper Body Dressing: Minimum assistance    Lower Body Dressing: Moderate assistance;Maximum assistance    Toileting: Moderate assistance;Maximum assistance    Cognitive Retraining  Safety/Judgement: Decreased awareness of environment    Therapeutic Exercise:  None provided on current date    Pain:  Pt reports 0/10 pain or discomfort prior to treatment.    Pt reports 0/10 pain or discomfort post treatment. Activity Tolerance:   Fair    Please refer to the flowsheet for vital signs taken during this treatment. After treatment:   []  Patient left in no apparent distress sitting up in chair  [x]  Patient left in no apparent distress in bed  [x]  Call bell left within reach  []  Nursing notified  [x]  Caregiver present  []  Bed alarm activated    COMMUNICATION/EDUCATION:   Communication/Collaboration:  []      Home safety education was provided and the patient/caregiver indicated understanding. [x]      Patient/family have participated as able and agree with findings and recommendations. []      Patient is unable to participate in plan of care at this time.     Suleman Connell  Time Calculation: 23 mins

## 2018-07-02 NOTE — PROGRESS NOTES
Attempted to contacted [facility] with no response. Left a voice mail with name, facility and unit contact information. Verbal bedside report given to Medical Transport  on Merna Caal  being transferred to [facility] for routine progression of care       Recent vitals:  T97.4  /71   HR 78    R16   100% on RA  SBAR  Discharge packet with prescriptions       Opportunity for questions and clarification was provided.       Patient transported with:   Tech  1 bag of belongings  glasses and bracelet with patient    Patient armband removed and shredded  Lines removed

## 2018-07-02 NOTE — DISCHARGE SUMMARY
Discharge Summary    Patient: Irean Phalen MRN: [id number]  CSN: [id number]    YOB: 1932  Age: 80 y.o. Sex: female    DOA: 6/29/2018 LOS:  LOS: 0 days   Discharge Date:      Admission Diagnoses: Syncope    Discharge Diagnoses:  PLEASE SEE DICTATION. Discharge Condition: Stable    PHYSICAL EXAM  Visit Vitals    /81 (BP 1 Location: Right arm, BP Patient Position: At rest)    Pulse (!) 54    Temp 97.4 °F (36.3 °C)    Resp 15    Wt 73 kg (161 lb)    SpO2 100%    BMI 24.48 kg/m2       General: Alert, cooperative, no acute distress    Lungs:  CTA Bilaterally. Heart:  Regular rate and Rhythm. Abdomen: Soft, Non distended, Non tender. + Bowel sounds. Extremities: No edema/ cyanosis/ clubbing  Neurologic:  AA oriented X 2. Moves all extremities. Hospital Course: Please see dictation. code # [id number]. Discharge Medications:     Current Discharge Medication List      CONTINUE these medications which have NOT CHANGED    Details   donepezil (ARICEPT) 10 mg tablet Take 10 mg by mouth nightly. acetaminophen (TYLENOL) 325 mg tablet Take 650 mg by mouth every four (4) hours as needed for Pain. aspirin 81 mg tablet Take 81 mg by mouth daily. ferrous sulfate (IRON) 325 mg (65 mg iron) EC tablet Take 1 Tab by mouth three (3) times daily (with meals). Qty: 90 Tab, Refills: 3         STOP taking these medications       furosemide (LASIX) 20 mg tablet Comments:   Reason for Stopping:             · It is important that you take the medication exactly as they are prescribed. · Keep your medication in the bottles provided by the pharmacist and keep a list of the medication names, dosages, and times to be taken in your wallet. · Do not take other medications without consulting your doctor.      DIET:  Cardiac Diet    ACTIVITY: Activity as tolerated  Home health care for Skilled care for Hypertension and medication management    ·    PT/OT consult      ADDITIONAL INFORMATION: If you experience any of the following symptoms but not limited to Fever, chills, nausea, vomiting, diarrhea, change in mentation, falling, bleeding, shortness of breath, chest pain, please call your primary care physician or return to the emergency room if you cannot get hold of your doctor:     FOLLOW UP CARE:  Dr. Kermit Painter MD in 5 days. Please call and set up an appointment.     Minutes spent on discharge: 40 minutes spent coordinating this discharge (review instructions/follow-up, prescriptions, preparing report for sign off)    Ryne Jeffrey MD  7/2/2018 9:13 AM

## 2018-07-02 NOTE — PROGRESS NOTES
CMS went to speak with the pt in regards to the \"Patient Guide to Observation & Outpatient Care\" and the [facility] Outpatient Observation Notice. \"  Pt was unable to review and sign the documents provided. No family were present at bedside. Unsigned, but noted documents can be found in the chart for review; additional copies were left at pt bedside for family review.

## 2021-10-05 NOTE — ROUTINE PROCESS
Bedside and Verbal shift change report given to [employer] (oncoming nurse) by GUILLERMO Deng RN (offgoing nurse). Report given with SBAR, Kardex, MAR and Recent Results. Pt lvm and was asking to go over her lab results from a few weeks ago ans states that she is still pretty sick.